# Patient Record
Sex: MALE | Race: WHITE | Employment: FULL TIME | URBAN - METROPOLITAN AREA
[De-identification: names, ages, dates, MRNs, and addresses within clinical notes are randomized per-mention and may not be internally consistent; named-entity substitution may affect disease eponyms.]

---

## 2017-01-27 ENCOUNTER — OFFICE VISIT (OUTPATIENT)
Dept: CARDIOLOGY CLINIC | Age: 57
End: 2017-01-27

## 2017-01-27 VITALS
HEART RATE: 68 BPM | HEIGHT: 69 IN | SYSTOLIC BLOOD PRESSURE: 110 MMHG | WEIGHT: 242.8 LBS | BODY MASS INDEX: 35.96 KG/M2 | OXYGEN SATURATION: 97 % | DIASTOLIC BLOOD PRESSURE: 80 MMHG

## 2017-01-27 DIAGNOSIS — I42.9 CARDIOMYOPATHY (HCC): ICD-10-CM

## 2017-01-27 DIAGNOSIS — E78.00 PURE HYPERCHOLESTEROLEMIA: ICD-10-CM

## 2017-01-27 DIAGNOSIS — I25.10 CORONARY ARTERY DISEASE INVOLVING NATIVE CORONARY ARTERY OF NATIVE HEART WITHOUT ANGINA PECTORIS: Primary | ICD-10-CM

## 2017-01-27 DIAGNOSIS — I50.22 CHRONIC SYSTOLIC CONGESTIVE HEART FAILURE (HCC): ICD-10-CM

## 2017-01-27 PROCEDURE — 99214 OFFICE O/P EST MOD 30 MIN: CPT | Performed by: INTERNAL MEDICINE

## 2017-01-27 RX ORDER — GABAPENTIN 100 MG/1
100 CAPSULE ORAL 3 TIMES DAILY
COMMUNITY
End: 2017-08-11 | Stop reason: ALTCHOICE

## 2017-01-27 ASSESSMENT — ENCOUNTER SYMPTOMS
BLOOD IN STOOL: 0
ABDOMINAL DISTENTION: 0
COUGH: 0
WHEEZING: 0
EYE REDNESS: 0
SHORTNESS OF BREATH: 1

## 2017-05-31 RX ORDER — CARVEDILOL 3.12 MG/1
3.12 TABLET ORAL 2 TIMES DAILY WITH MEALS
Qty: 180 TABLET | Refills: 2 | Status: SHIPPED | OUTPATIENT
Start: 2017-05-31 | End: 2018-02-19 | Stop reason: SDUPTHER

## 2017-07-03 ENCOUNTER — TELEPHONE (OUTPATIENT)
Dept: CARDIOLOGY CLINIC | Age: 57
End: 2017-07-03

## 2017-08-11 ENCOUNTER — OFFICE VISIT (OUTPATIENT)
Dept: CARDIOLOGY CLINIC | Age: 57
End: 2017-08-11

## 2017-08-11 VITALS
OXYGEN SATURATION: 95 % | DIASTOLIC BLOOD PRESSURE: 70 MMHG | HEART RATE: 75 BPM | BODY MASS INDEX: 36.13 KG/M2 | HEIGHT: 68 IN | SYSTOLIC BLOOD PRESSURE: 122 MMHG | WEIGHT: 238.4 LBS

## 2017-08-11 DIAGNOSIS — F17.200 TOBACCO USE DISORDER: ICD-10-CM

## 2017-08-11 DIAGNOSIS — I50.22 CHRONIC SYSTOLIC CONGESTIVE HEART FAILURE (HCC): ICD-10-CM

## 2017-08-11 DIAGNOSIS — E78.2 MIXED HYPERLIPIDEMIA: ICD-10-CM

## 2017-08-11 DIAGNOSIS — R06.09 DYSPNEA ON EXERTION: ICD-10-CM

## 2017-08-11 DIAGNOSIS — I25.5 ISCHEMIC CARDIOMYOPATHY: ICD-10-CM

## 2017-08-11 DIAGNOSIS — I25.10 CORONARY ARTERY DISEASE INVOLVING NATIVE CORONARY ARTERY OF NATIVE HEART WITHOUT ANGINA PECTORIS: Primary | ICD-10-CM

## 2017-08-11 DIAGNOSIS — E78.00 PURE HYPERCHOLESTEROLEMIA: ICD-10-CM

## 2017-08-11 PROCEDURE — 99214 OFFICE O/P EST MOD 30 MIN: CPT | Performed by: INTERNAL MEDICINE

## 2017-08-11 RX ORDER — LISINOPRIL 10 MG/1
10 TABLET ORAL DAILY
COMMUNITY
End: 2021-02-25 | Stop reason: SDUPTHER

## 2017-08-11 RX ORDER — OXYCODONE HCL 10 MG/1
10 TABLET, FILM COATED, EXTENDED RELEASE ORAL EVERY 12 HOURS
COMMUNITY
End: 2020-09-17

## 2017-08-11 RX ORDER — PRAMIPEXOLE DIHYDROCHLORIDE 0.5 MG/1
0.5 TABLET ORAL NIGHTLY
COMMUNITY
End: 2018-02-09

## 2017-08-11 RX ORDER — CLONAZEPAM 0.5 MG/1
0.5 TABLET ORAL 2 TIMES DAILY PRN
COMMUNITY
End: 2019-04-17

## 2017-08-14 ENCOUNTER — TELEPHONE (OUTPATIENT)
Dept: CARDIOLOGY CLINIC | Age: 57
End: 2017-08-14

## 2017-08-14 NOTE — TELEPHONE ENCOUNTER
Patient's insurance requires that the pt have a prior auth before completing his echo. I am currently working on the pt's prior auth. Spoke to Jose Martin [place] today ref # [id number], per Sunny Jolanta should have an answer with in 48 hours.

## 2017-08-21 NOTE — TELEPHONE ENCOUNTER
Called to check on prior auth, it was denied. Spoke to Kaitlin Hernández who states the echo was denied bc there was not a \"new diagnosis\" or change in the pt's status. Please advise if Dr. Jenny Shannon would like to do a peer to peer.

## 2017-08-21 NOTE — TELEPHONE ENCOUNTER
Sending over for Dr. Xie Lot to do Peer to Peer per Lencho Hancock from [place] Ref# [id number] P- [phone].  Info on Dr. Anselmo kraft

## 2017-08-25 NOTE — TELEPHONE ENCOUNTER
Called the insurance company- sat on line for 15 minutes- they hung up on me. Cancel ECHO. Let patient know.

## 2017-08-28 RX ORDER — ATORVASTATIN CALCIUM 40 MG/1
40 TABLET, FILM COATED ORAL NIGHTLY
Qty: 90 TABLET | Refills: 3 | Status: SHIPPED | OUTPATIENT
Start: 2017-08-28 | End: 2018-08-13 | Stop reason: SDUPTHER

## 2017-08-29 NOTE — TELEPHONE ENCOUNTER
Patients wife called [facility] about Echo being precerted and [facility] called our office and gave a Clinical Line number for doctor to call back and get approved. [phone]. If still cancelling  ECHO can a nurse call and explain to patients wife why it is not being ordered now. She will want to know. She always has a lot of medical questions that pertain to her .

## 2017-09-01 NOTE — TELEPHONE ENCOUNTER
Called [facility] too late to do Peer to Peer will have to do a Appeal letter. Dr. Evonne Craig is going to write letter to appeal the Echo. Will type up and send to [facility].

## 2017-09-01 NOTE — TELEPHONE ENCOUNTER
Pt complains of KRISHNAMURTHY and chest pain. Pt has hx of CAD, s/p CABG, mitral insufficiency and CM. Needs echo to evaluate worsening KRISHNAMURTHY.

## 2017-09-12 NOTE — TELEPHONE ENCOUNTER
Faxed an appeal letter to [facility] on Sept 7th. Appeal letter scanned in [address]. Patients wife called again on status of Echo. Gave her info that an appeal was sent in to [facility] and we would get back with her when we hear something.

## 2018-02-09 ENCOUNTER — OFFICE VISIT (OUTPATIENT)
Dept: CARDIOLOGY CLINIC | Age: 58
End: 2018-02-09

## 2018-02-09 VITALS
HEIGHT: 69 IN | HEART RATE: 87 BPM | SYSTOLIC BLOOD PRESSURE: 130 MMHG | WEIGHT: 249.12 LBS | BODY MASS INDEX: 36.9 KG/M2 | DIASTOLIC BLOOD PRESSURE: 70 MMHG | OXYGEN SATURATION: 96 %

## 2018-02-09 DIAGNOSIS — I50.22 CHRONIC SYSTOLIC CONGESTIVE HEART FAILURE (HCC): ICD-10-CM

## 2018-02-09 DIAGNOSIS — I25.5 ISCHEMIC CARDIOMYOPATHY: ICD-10-CM

## 2018-02-09 DIAGNOSIS — E78.00 PURE HYPERCHOLESTEROLEMIA: ICD-10-CM

## 2018-02-09 DIAGNOSIS — F17.200 TOBACCO USE DISORDER: ICD-10-CM

## 2018-02-09 DIAGNOSIS — I25.10 CORONARY ARTERY DISEASE INVOLVING NATIVE CORONARY ARTERY OF NATIVE HEART WITHOUT ANGINA PECTORIS: Primary | ICD-10-CM

## 2018-02-09 PROCEDURE — 3017F COLORECTAL CA SCREEN DOC REV: CPT | Performed by: INTERNAL MEDICINE

## 2018-02-09 PROCEDURE — G8598 ASA/ANTIPLAT THER USED: HCPCS | Performed by: INTERNAL MEDICINE

## 2018-02-09 PROCEDURE — 99214 OFFICE O/P EST MOD 30 MIN: CPT | Performed by: INTERNAL MEDICINE

## 2018-02-09 PROCEDURE — 1036F TOBACCO NON-USER: CPT | Performed by: INTERNAL MEDICINE

## 2018-02-09 PROCEDURE — G8427 DOCREV CUR MEDS BY ELIG CLIN: HCPCS | Performed by: INTERNAL MEDICINE

## 2018-02-09 PROCEDURE — G8417 CALC BMI ABV UP PARAM F/U: HCPCS | Performed by: INTERNAL MEDICINE

## 2018-02-09 PROCEDURE — G8484 FLU IMMUNIZE NO ADMIN: HCPCS | Performed by: INTERNAL MEDICINE

## 2018-02-09 RX ORDER — PREDNISONE 10 MG/1
10 TABLET ORAL DAILY
COMMUNITY
End: 2019-04-17 | Stop reason: ALTCHOICE

## 2018-02-09 ASSESSMENT — ENCOUNTER SYMPTOMS
BLOOD IN STOOL: 0
WHEEZING: 0
COUGH: 0
ABDOMINAL DISTENTION: 0
SHORTNESS OF BREATH: 0
EYE REDNESS: 0

## 2018-02-09 NOTE — PROGRESS NOTES
reviewed. Blood pressure 130/70, pulse 87, height 5' 8.5\" (1.74 m), weight 249 lb 1.9 oz (113 kg), SpO2 96 %. Vitals:    02/09/18 1441   BP: 130/70   Site: Left Arm   Position: Sitting   Cuff Size: Large Adult   Pulse: 87   SpO2: 96%   Weight: 249 lb 1.9 oz (113 kg)   Height: 5' 8.5\" (1.74 m)     Body mass index is 37.33 kg/m². Wt Readings from Last 3 Encounters:   02/09/18 249 lb 1.9 oz (113 kg)   08/11/17 238 lb 6.4 oz (108.1 kg)   01/27/17 242 lb 12.8 oz (110.1 kg)     BP Readings from Last 3 Encounters:   02/09/18 130/70   08/11/17 122/70   01/27/17 110/80        Current Outpatient Prescriptions   Medication Sig Dispense Refill    predniSONE (DELTASONE) 10 MG tablet Take 10 mg by mouth daily      atorvastatin (LIPITOR) 40 MG tablet Take 1 tablet by mouth nightly 90 tablet 3    lisinopril (PRINIVIL;ZESTRIL) 10 MG tablet Take 10 mg by mouth daily      oxyCODONE (OXYCONTIN) 10 MG extended release tablet Take 10 mg by mouth every 12 hours .  clonazePAM (KLONOPIN) 0.5 MG tablet Take 0.5 mg by mouth 2 times daily as needed      carvedilol (COREG) 3.125 MG tablet Take 1 tablet by mouth 2 times daily (with meals) 180 tablet 2    budesonide-formoterol (SYMBICORT) 160-4.5 MCG/ACT AERO Inhale 2 puffs into the lungs 2 times daily      albuterol sulfate  (90 BASE) MCG/ACT inhaler Inhale 2 puffs into the lungs every 4 hours as needed for Wheezing      amoxicillin (AMOXIL) 500 MG capsule Take 500 mg by mouth 3 times daily as needed      predniSONE (DELTASONE) 10 MG tablet Take 10 mg by mouth daily      aspirin 81 MG tablet Take 81 mg by mouth daily      fluticasone (FLONASE) 50 MCG/ACT nasal spray 2 sprays by Nasal route daily      Tiotropium Bromide Monohydrate (SPIRIVA HANDIHALER IN) Inhale into the lungs      pantoprazole (PROTONIX) 40 MG tablet Take 40 mg by mouth daily.  docusate sodium (COLACE, DULCOLAX) 100 MG CAPS Take 100 mg by mouth 2 times daily.  60 capsule 0     No current

## 2018-02-19 RX ORDER — CARVEDILOL 3.12 MG/1
TABLET ORAL
Qty: 180 TABLET | Refills: 2 | Status: SHIPPED | OUTPATIENT
Start: 2018-02-19 | End: 2018-11-05 | Stop reason: SDUPTHER

## 2018-08-13 RX ORDER — ATORVASTATIN CALCIUM 40 MG/1
40 TABLET, FILM COATED ORAL NIGHTLY
Qty: 90 TABLET | Refills: 3 | Status: SHIPPED | OUTPATIENT
Start: 2018-08-13 | End: 2019-08-09 | Stop reason: SDUPTHER

## 2018-09-28 ENCOUNTER — OFFICE VISIT (OUTPATIENT)
Dept: CARDIOLOGY CLINIC | Age: 58
End: 2018-09-28
Payer: COMMERCIAL

## 2018-09-28 VITALS
HEART RATE: 70 BPM | OXYGEN SATURATION: 95 % | WEIGHT: 249 LBS | BODY MASS INDEX: 37.74 KG/M2 | HEIGHT: 68 IN | DIASTOLIC BLOOD PRESSURE: 74 MMHG | SYSTOLIC BLOOD PRESSURE: 114 MMHG

## 2018-09-28 DIAGNOSIS — G47.33 OSA (OBSTRUCTIVE SLEEP APNEA): ICD-10-CM

## 2018-09-28 DIAGNOSIS — E78.2 MIXED HYPERLIPIDEMIA: ICD-10-CM

## 2018-09-28 DIAGNOSIS — I25.5 ISCHEMIC CARDIOMYOPATHY: ICD-10-CM

## 2018-09-28 DIAGNOSIS — R07.89 ATYPICAL CHEST PAIN: ICD-10-CM

## 2018-09-28 DIAGNOSIS — I25.10 CORONARY ARTERY DISEASE INVOLVING NATIVE CORONARY ARTERY OF NATIVE HEART WITHOUT ANGINA PECTORIS: Primary | ICD-10-CM

## 2018-09-28 PROCEDURE — G8427 DOCREV CUR MEDS BY ELIG CLIN: HCPCS | Performed by: INTERNAL MEDICINE

## 2018-09-28 PROCEDURE — 3017F COLORECTAL CA SCREEN DOC REV: CPT | Performed by: INTERNAL MEDICINE

## 2018-09-28 PROCEDURE — 1036F TOBACCO NON-USER: CPT | Performed by: INTERNAL MEDICINE

## 2018-09-28 PROCEDURE — G8598 ASA/ANTIPLAT THER USED: HCPCS | Performed by: INTERNAL MEDICINE

## 2018-09-28 PROCEDURE — G8417 CALC BMI ABV UP PARAM F/U: HCPCS | Performed by: INTERNAL MEDICINE

## 2018-09-28 PROCEDURE — 99214 OFFICE O/P EST MOD 30 MIN: CPT | Performed by: INTERNAL MEDICINE

## 2018-09-28 ASSESSMENT — ENCOUNTER SYMPTOMS
SHORTNESS OF BREATH: 0
WHEEZING: 0
COUGH: 0
EYE REDNESS: 0
ABDOMINAL DISTENTION: 0
BLOOD IN STOOL: 0

## 2018-09-28 NOTE — PROGRESS NOTES
Bilateral expiratory     Abdominal: Soft. Bowel sounds are normal.   Musculoskeletal: Normal range of motion. He exhibits no edema. Neurological: He is alert and oriented to person, place, and time. Skin: Skin is warm and dry. Psychiatric: He has a normal mood and affect. His behavior is normal.   Nursing note and vitals reviewed. Blood pressure 114/74, pulse 70, height 5' 8\" (1.727 m), weight 249 lb (112.9 kg), SpO2 95 %. Vitals:    09/28/18 1405   BP: 114/74   Pulse: 70   SpO2: 95%   Weight: 249 lb (112.9 kg)   Height: 5' 8\" (1.727 m)     Body mass index is 37.86 kg/m². Wt Readings from Last 3 Encounters:   09/28/18 249 lb (112.9 kg)   02/09/18 249 lb 1.9 oz (113 kg)   08/11/17 238 lb 6.4 oz (108.1 kg)     BP Readings from Last 3 Encounters:   09/28/18 114/74   02/09/18 130/70   08/11/17 122/70        Current Outpatient Prescriptions   Medication Sig Dispense Refill    atorvastatin (LIPITOR) 40 MG tablet Take 1 tablet by mouth nightly 90 tablet 3    carvedilol (COREG) 3.125 MG tablet TAKE 1 TABLET BY MOUTH TWICE A  tablet 2    predniSONE (DELTASONE) 10 MG tablet Take 10 mg by mouth daily      lisinopril (PRINIVIL;ZESTRIL) 10 MG tablet Take 10 mg by mouth daily      oxyCODONE (OXYCONTIN) 10 MG extended release tablet Take 10 mg by mouth every 12 hours .       clonazePAM (KLONOPIN) 0.5 MG tablet Take 0.5 mg by mouth 2 times daily as needed      budesonide-formoterol (SYMBICORT) 160-4.5 MCG/ACT AERO Inhale 2 puffs into the lungs 2 times daily      albuterol sulfate  (90 BASE) MCG/ACT inhaler Inhale 2 puffs into the lungs every 4 hours as needed for Wheezing      amoxicillin (AMOXIL) 500 MG capsule Take 500 mg by mouth 3 times daily as needed      predniSONE (DELTASONE) 10 MG tablet Take 10 mg by mouth daily      aspirin 81 MG tablet Take 81 mg by mouth daily      fluticasone (FLONASE) 50 MCG/ACT nasal spray 2 sprays by Nasal route daily      Tiotropium Bromide Monohydrate

## 2018-10-12 ENCOUNTER — TELEPHONE (OUTPATIENT)
Dept: CARDIOLOGY CLINIC | Age: 58
End: 2018-10-12

## 2018-11-05 RX ORDER — CARVEDILOL 3.12 MG/1
TABLET ORAL
Qty: 180 TABLET | Refills: 3 | Status: SHIPPED | OUTPATIENT
Start: 2018-11-05 | End: 2020-01-27

## 2019-04-17 ENCOUNTER — OFFICE VISIT (OUTPATIENT)
Dept: CARDIOLOGY CLINIC | Age: 59
End: 2019-04-17
Payer: COMMERCIAL

## 2019-04-17 VITALS
DIASTOLIC BLOOD PRESSURE: 80 MMHG | OXYGEN SATURATION: 96 % | BODY MASS INDEX: 37.16 KG/M2 | HEIGHT: 68 IN | HEART RATE: 84 BPM | SYSTOLIC BLOOD PRESSURE: 120 MMHG | WEIGHT: 245.2 LBS

## 2019-04-17 DIAGNOSIS — I42.8 OTHER CARDIOMYOPATHY (HCC): ICD-10-CM

## 2019-04-17 DIAGNOSIS — E78.2 MIXED HYPERLIPIDEMIA: ICD-10-CM

## 2019-04-17 DIAGNOSIS — I25.10 CORONARY ARTERY DISEASE INVOLVING NATIVE CORONARY ARTERY OF NATIVE HEART WITHOUT ANGINA PECTORIS: Primary | ICD-10-CM

## 2019-04-17 PROCEDURE — 1036F TOBACCO NON-USER: CPT | Performed by: INTERNAL MEDICINE

## 2019-04-17 PROCEDURE — G8417 CALC BMI ABV UP PARAM F/U: HCPCS | Performed by: INTERNAL MEDICINE

## 2019-04-17 PROCEDURE — G8427 DOCREV CUR MEDS BY ELIG CLIN: HCPCS | Performed by: INTERNAL MEDICINE

## 2019-04-17 PROCEDURE — 3017F COLORECTAL CA SCREEN DOC REV: CPT | Performed by: INTERNAL MEDICINE

## 2019-04-17 PROCEDURE — 99214 OFFICE O/P EST MOD 30 MIN: CPT | Performed by: INTERNAL MEDICINE

## 2019-04-17 PROCEDURE — G8598 ASA/ANTIPLAT THER USED: HCPCS | Performed by: INTERNAL MEDICINE

## 2019-04-17 RX ORDER — MORPHINE SULFATE 30 MG/1
30 TABLET ORAL EVERY 4 HOURS PRN
COMMUNITY
End: 2019-10-24

## 2019-04-17 RX ORDER — PRAMIPEXOLE DIHYDROCHLORIDE 0.5 MG/1
0.5 TABLET ORAL NIGHTLY
COMMUNITY
End: 2020-09-17

## 2019-04-17 ASSESSMENT — ENCOUNTER SYMPTOMS
WHEEZING: 1
EYE REDNESS: 0
COUGH: 0
BLOOD IN STOOL: 0
ABDOMINAL DISTENTION: 0
SHORTNESS OF BREATH: 0

## 2019-04-17 NOTE — PATIENT INSTRUCTIONS
Patient Education        Learning About Coronary Artery Disease (CAD)  What is coronary artery disease? Coronary artery disease (CAD) occurs when plaque builds up in the arteries that bring oxygen-rich blood to your heart. Plaque is a fatty substance made of cholesterol, calcium, and other substances in the blood. This process is called hardening of the arteries, or atherosclerosis. What happens when you have coronary artery disease? · Plaque may narrow the coronary arteries. Narrowed arteries cause poor blood flow. This can lead to angina symptoms such as chest pain or discomfort. If blood flow is completely blocked, you could have a heart attack. · You can slow CAD and reduce the risk of future problems by making changes in your lifestyle. These include quitting smoking and eating heart-healthy foods. · Treatments for CAD, along with changes in your lifestyle, can help you live a longer and healthier life. How can you prevent coronary artery disease? · Do not smoke. It may be the best thing you can do to prevent heart disease. If you need help quitting, talk to your doctor about stop-smoking programs and medicines. These can increase your chances of quitting for good. · Be active. Get at least 30 minutes of exercise on most days of the week. Walking is a good choice. You also may want to do other activities, such as running, swimming, cycling, or playing tennis or team sports. · Eat heart-healthy foods. Eat more fruits and vegetables and less foods that contain saturated and trans fats. Limit alcohol, sodium, and sweets. · Stay at a healthy weight. Lose weight if you need to. · Manage other health problems such as diabetes, high blood pressure, and high cholesterol. · Manage stress. Stress can hurt your heart. To keep stress low, talk about your problems and feelings. Don't keep your feelings hidden. · If you have talked about it with your doctor, take a low-dose aspirin every day.  Aspirin can help certain people lower their risk of a heart attack or stroke. But taking aspirin isn't right for everyone, because it can cause serious bleeding. Do not start taking daily aspirin unless your doctor knows about it. How is coronary artery disease treated? · Your doctor will suggest that you make lifestyle changes. For example, your doctor may ask you to eat healthy foods, quit smoking, lose extra weight, and be more active. · You will have to take medicines. · Your doctor may suggest a procedure to open narrowed or blocked arteries. This is called angioplasty. Or your doctor may suggest using healthy blood vessels to create detours around narrowed or blocked arteries. This is called bypass surgery. Follow-up care is a key part of your treatment and safety. Be sure to make and go to all appointments, and call your doctor if you are having problems. It's also a good idea to know your test results and keep a list of the medicines you take. Where can you learn more? Go to https://[employer].ServerEngines. org and sign in to your [employer] account. Enter [phone] in the [employer] box to learn more about \"Learning About Coronary Artery Disease (CAD). \"     If you do not have an account, please click on the \"Sign Up Now\" link. Current as of: July 22, 2018  Content Version: 11.9  © 2488-0475 [employer]. Care instructions adapted under license by [facility] ([facility]). If you have questions about a medical condition or this instruction, always ask your healthcare professional. [place] [address] any warranty or liability for your use of this information.

## 2019-04-17 NOTE — PROGRESS NOTES
Subjective:      Patient ID: Calos Malloy is a 63 y.o. male. Reason for visit: f/u CAD    HPI Calos Malloy presents today for a follow up for CAD. He denies exertional chest pain, palpitations, dizziness, syncope, leg swelling and worsening chronic dyspnea. He states that his chest pain has improved but continues to have randomly occurring, sharp, brief episodes of chest pain. Review of Systems   Constitutional: Negative for activity change, appetite change, chills, fatigue, fever and unexpected weight change. HENT: Negative for congestion, nosebleeds and tinnitus. Eyes: Negative for redness and visual disturbance. Respiratory: Positive for wheezing. Negative for cough and shortness of breath. Cardiovascular: Positive for chest pain. Negative for palpitations and leg swelling. Gastrointestinal: Negative for abdominal distention and blood in stool. Genitourinary: Negative for dysuria and hematuria. Musculoskeletal: Negative for gait problem and myalgias. Neurological: Negative for dizziness and speech difficulty. Hematological: Does not bruise/bleed easily. Psychiatric/Behavioral: Negative for behavioral problems and confusion. All other systems reviewed and are negative. Objective:   Physical Exam   Constitutional: He is oriented to person, place, and time. He appears well-developed and well-nourished. obese   HENT:   Head: Normocephalic and atraumatic. Eyes: Conjunctivae are normal. Right eye exhibits no discharge. Left eye exhibits no discharge. Neck: Normal range of motion. Neck supple. Cardiovascular: Normal rate, regular rhythm, normal heart sounds and intact distal pulses. Pulmonary/Chest: Effort normal. He has no wheezes. Abdominal: Soft. Bowel sounds are normal.   Musculoskeletal: Normal range of motion. He exhibits no edema. Neurological: He is alert and oriented to person, place, and time. Skin: Skin is warm and dry.    Psychiatric: He has a capsule 0     No current facility-administered medications for this visit. Past Surgical History:   Procedure Laterality Date    CORONARY ARTERY BYPASS GRAFT      CORONARY ARTERY BYPASS GRAFT      ELBOW SURGERY      EYE SURGERY  1965    SHOULDER SURGERY      SKIN CANCER EXCISION       Social History     Tobacco Use    Smoking status: Former Smoker     Packs/day: 1.50     Years: 40.00     Pack years: 60.00     Types: Cigarettes     Last attempt to quit: 2014     Years since quittin.4    Smokeless tobacco: Never Used    Tobacco comment: Quit with surgery   Substance Use Topics    Alcohol use: No    Drug use: No     No Known Allergies  Family History   Problem Relation Age of Onset    High Blood Pressure Mother     Heart Disease Mother     Diabetes Mother      Recent labs and imaging reviewed. Assessment:       CAD s/p NSTEMI -->Cath 2014 99% LM. s/p CABG x3 LIMA-LAD, SVG-->RI-->OM. 2015. Plain GXT 2015 no ischemia.  SANTINO- using BiPAP     Tobacco use history/COPD      Cessation discussed, Quit 2014    HLD, on statin. LDL 76 2018.  Ulcer ([address]       Hx        Chest pain, chronic, atypical since CABG surgery. Cardiomyopathy- ischemia, LVEF 35% by cath 2014. Echo 2014 LVEF 40-45%, mild-mod MR. Echo 10/2017 LVEF normal, grade I DD, mildly reduced RVSF. CHF- chronic systolic with restored LVEF. Palpitations. R/o arrhythmia. Event recorder 2016 no arrhythmias. Hyperkalemia- ACE dose reduced by PCP. Low potassium diet recommended. Repeat K normal.        COPD followed by Dr. Sophia Mtz. Steroid dependent, using BiPAP and nebulizer. Pt states that he needs lung transplant. Plan:      No angina or clinical evidence of CHF. Recent stress test showed no ischemia. Continue ASA, statin, BB and ACE. Fasting lipid profile, CMP prior to next visit at PCP's. Follow up in 6 months.      This note was scribed in the presence of the physician by Tomasa Adams RN. The scribes documentation has been prepared under my direction and personally reviewed by me in its entirety. I confirm that the note above accurately reflects all work, treatment, procedures, and medical decision making performed by me.

## 2019-04-17 NOTE — LETTER
[address] Cardiology - [address] [address]  [address]   [address] [address]  Phone: [phone]  Fax: Tierra Velázquez MD        April 24, 2019     Richy Avila, [address] [address] [facility] [address]    Patient: Ashley Sanchez  MR Number: [id number]  YOB: 1960  Date of Visit: 4/17/2019    Dear Dr. Richy Avila: Thank you for your referral. Progress note attached in visit summary. If you have questions, please do not hesitate to call me. I look forward to following Soila Mccall along with you.     Sincerely,        Duyen Tavares MD

## 2019-08-09 RX ORDER — ATORVASTATIN CALCIUM 40 MG/1
TABLET, FILM COATED ORAL
Qty: 90 TABLET | Refills: 3 | Status: SHIPPED | OUTPATIENT
Start: 2019-08-09 | End: 2021-02-25 | Stop reason: SDUPTHER

## 2019-10-24 ENCOUNTER — OFFICE VISIT (OUTPATIENT)
Dept: CARDIOLOGY CLINIC | Age: 59
End: 2019-10-24
Payer: COMMERCIAL

## 2019-10-24 VITALS
SYSTOLIC BLOOD PRESSURE: 120 MMHG | HEART RATE: 75 BPM | BODY MASS INDEX: 36.83 KG/M2 | OXYGEN SATURATION: 95 % | WEIGHT: 243 LBS | HEIGHT: 68 IN | DIASTOLIC BLOOD PRESSURE: 60 MMHG

## 2019-10-24 DIAGNOSIS — I25.10 CORONARY ARTERY DISEASE INVOLVING NATIVE CORONARY ARTERY OF NATIVE HEART WITHOUT ANGINA PECTORIS: Primary | ICD-10-CM

## 2019-10-24 DIAGNOSIS — I25.5 ISCHEMIC CARDIOMYOPATHY: ICD-10-CM

## 2019-10-24 DIAGNOSIS — G47.33 OSA (OBSTRUCTIVE SLEEP APNEA): ICD-10-CM

## 2019-10-24 DIAGNOSIS — E78.2 MIXED HYPERLIPIDEMIA: ICD-10-CM

## 2019-10-24 PROCEDURE — G8484 FLU IMMUNIZE NO ADMIN: HCPCS | Performed by: INTERNAL MEDICINE

## 2019-10-24 PROCEDURE — G8427 DOCREV CUR MEDS BY ELIG CLIN: HCPCS | Performed by: INTERNAL MEDICINE

## 2019-10-24 PROCEDURE — G8598 ASA/ANTIPLAT THER USED: HCPCS | Performed by: INTERNAL MEDICINE

## 2019-10-24 PROCEDURE — 1036F TOBACCO NON-USER: CPT | Performed by: INTERNAL MEDICINE

## 2019-10-24 PROCEDURE — G8417 CALC BMI ABV UP PARAM F/U: HCPCS | Performed by: INTERNAL MEDICINE

## 2019-10-24 PROCEDURE — 3017F COLORECTAL CA SCREEN DOC REV: CPT | Performed by: INTERNAL MEDICINE

## 2019-10-24 PROCEDURE — 99214 OFFICE O/P EST MOD 30 MIN: CPT | Performed by: INTERNAL MEDICINE

## 2019-10-24 ASSESSMENT — ENCOUNTER SYMPTOMS
ABDOMINAL DISTENTION: 0
BLOOD IN STOOL: 0
WHEEZING: 1
EYE REDNESS: 0
SHORTNESS OF BREATH: 0
COUGH: 0

## 2020-01-27 RX ORDER — CARVEDILOL 3.12 MG/1
TABLET ORAL
Qty: 180 TABLET | Refills: 3 | Status: SHIPPED | OUTPATIENT
Start: 2020-01-27 | End: 2021-02-25 | Stop reason: SDUPTHER

## 2020-04-07 ENCOUNTER — TELEPHONE (OUTPATIENT)
Dept: CARDIOLOGY CLINIC | Age: 60
End: 2020-04-07

## 2020-04-07 NOTE — TELEPHONE ENCOUNTER
Per Dr. Jordana Frank. Meloxicam can increase risk of heart failure and bleeding. Better to avoid this medication.  Check with PCP for alternative

## 2020-04-07 NOTE — TELEPHONE ENCOUNTER
Patient was put on Meloxicam 15 mg by the [address] [place] [address]. His wife wants to know if he is okay to take that med with his cardiac history. Please call patient back at [phone].

## 2020-09-02 ASSESSMENT — ENCOUNTER SYMPTOMS
EYE REDNESS: 0
ABDOMINAL DISTENTION: 0
SHORTNESS OF BREATH: 0
BLOOD IN STOOL: 0
WHEEZING: 0
COUGH: 0

## 2020-09-02 NOTE — PROGRESS NOTES
Subjective:      Patient ID: Kathy Garcia is a 62 y.o. male. Reason for visit:  f/u CAD                   HPI Kathy Garcia presents today for a follow up for CAD. Today he denies exertional chest pain, palpitations, dizziness, syncope, worsening leg swelling and worsening chronic dyspnea. He has occasional, randomly occurring sharp brief pain along his sternal incision. He states that he is feeling well. Review of Systems   Constitutional: Negative for activity change, appetite change, chills, fatigue, fever and unexpected weight change. HENT: Negative for congestion, nosebleeds and tinnitus. Eyes: Negative for redness and visual disturbance. Respiratory: Negative for cough, shortness of breath and wheezing. Cardiovascular: Negative for palpitations and leg swelling. Gastrointestinal: Negative for abdominal distention and blood in stool. Genitourinary: Negative for dysuria and hematuria. Musculoskeletal: Negative for gait problem and myalgias. Neurological: Negative for dizziness and speech difficulty. Hematological: Does not bruise/bleed easily. Psychiatric/Behavioral: Negative for behavioral problems and confusion. All other systems reviewed and are negative. Objective:   Physical Exam   Constitutional: He is oriented to person, place, and time. He appears well-developed and well-nourished. obese   HENT:   Head: Normocephalic and atraumatic. Eyes: Conjunctivae and EOM are normal.   Neck: Normal range of motion. Neck supple. Cardiovascular: Normal rate, regular rhythm, S1 normal and S2 normal. Exam reveals no gallop. No murmur heard. Pulmonary/Chest: Effort normal. He has wheezes. Diminished, bilateral wheezes   Abdominal: Soft. Bowel sounds are normal.   Musculoskeletal: Normal range of motion. Neurological: He is alert and oriented to person, place, and time. Skin: Skin is warm and dry. Psychiatric: He has a normal mood and affect.  His behavior is normal. Pack years: 60.00     Types: Cigarettes     Last attempt to quit: 2014     Years since quittin.8    Smokeless tobacco: Never Used    Tobacco comment: Quit with surgery   Substance Use Topics    Alcohol use: No    Drug use: No     No Known Allergies  Family History   Problem Relation Age of Onset    High Blood Pressure Mother     Heart Disease Mother     Diabetes Mother      Recent labs and imaging reviewed. Assessment:       CAD s/p NSTEMI -->Cath 2014 99% LM. s/p CABG x3 LIMA-LAD, SVG-->RI-->OM. 2015. Plain GXT 2015 no ischemia. Stress test 10/10/18 normal stress test with no ischemia        SANTINO- using BiPAP       COPD/Prior tobacco use      Cessation discussed, Quit 2014. Followed by pulmonary. Steroid dependent, using BiPAP and nebulizer. Pt states that he needs lung transplant.  HLD, on statin. LDL 77 LFT's mildly elevated 2020.  Ulcer ([address]       Hx          Chest pain, chronic, atypical since CABG surgery. Cardiomyopathy- ischemia with restored LVEF.  LVEF 35% by cath 2014. Echo 2014 LVEF 40-45%, mild-mod MR. Echo 10/2017 LVEF normal, grade I DD, mildly reduced RVSF. Palpitations. R/o arrhythmia. Event recorder 2016 no arrhythmias. Hyperkalemia - ACE dose reduced by PCP. Low potassium diet recommended. Repeat K normal.       Plan:   Clinically stable. No evidence of CHF. LVEF improved. No angina. Continue ASA, statin,  BB, and ACE in view of CAD/CM. Risk factor modification also discussed. Follow up in 6 months. This note was scribed in the presence of the physician by Les Peralta RN. The scribes documentation has been prepared under my direction and personally reviewed by me in its entirety. I confirm that the note above accurately reflects all work, treatment, procedures, and medical decision making performed by me.

## 2020-09-17 ENCOUNTER — OFFICE VISIT (OUTPATIENT)
Dept: CARDIOLOGY CLINIC | Age: 60
End: 2020-09-17
Payer: MEDICARE

## 2020-09-17 VITALS
OXYGEN SATURATION: 97 % | DIASTOLIC BLOOD PRESSURE: 60 MMHG | HEIGHT: 68 IN | BODY MASS INDEX: 37.04 KG/M2 | SYSTOLIC BLOOD PRESSURE: 100 MMHG | HEART RATE: 78 BPM | WEIGHT: 244.4 LBS

## 2020-09-17 PROCEDURE — G8427 DOCREV CUR MEDS BY ELIG CLIN: HCPCS | Performed by: INTERNAL MEDICINE

## 2020-09-17 PROCEDURE — G8417 CALC BMI ABV UP PARAM F/U: HCPCS | Performed by: INTERNAL MEDICINE

## 2020-09-17 PROCEDURE — 99214 OFFICE O/P EST MOD 30 MIN: CPT | Performed by: INTERNAL MEDICINE

## 2020-09-17 PROCEDURE — 1036F TOBACCO NON-USER: CPT | Performed by: INTERNAL MEDICINE

## 2020-09-17 PROCEDURE — 3017F COLORECTAL CA SCREEN DOC REV: CPT | Performed by: INTERNAL MEDICINE

## 2020-09-17 NOTE — LETTER
[facility] Cardiology - [facility] [address]  [address]  Phone: [phone]  Fax: Tierra Velázquez MD        October 5, 2020     Massiel Laws, [address]    Patient: Monica Tillman  MR Number: <[id number]>  YOB: 1960  Date of Visit: 9/17/2020    Dear Dr. Massiel Laws: Thank you for your referral. Progress note attached in visit summary. If you have questions, please do not hesitate to call me. I look forward to following Kathy Duran along with you.     Sincerely,        Thuan Saravia MD

## 2020-09-17 NOTE — PATIENT INSTRUCTIONS
Patient Education        Learning About Coronary Artery Disease (CAD)  What is coronary artery disease? Coronary artery disease (CAD) occurs when plaque builds up in the arteries that bring oxygen-rich blood to your heart. Plaque is a fatty substance made of cholesterol, calcium, and other substances in the blood. This process is called hardening of the arteries, or atherosclerosis. What happens when you have coronary artery disease? · Plaque may narrow the coronary arteries. Narrowed arteries cause poor blood flow. This can lead to angina symptoms such as chest pain or discomfort. If blood flow is completely blocked, you could have a heart attack. · You can slow CAD and reduce the risk of future problems by making changes in your lifestyle. These include quitting smoking and eating heart-healthy foods. · Treatments for CAD, along with changes in your lifestyle, can help you live a longer and healthier life. How can you prevent coronary artery disease? · Do not smoke. It may be the best thing you can do to prevent heart disease. If you need help quitting, talk to your doctor about stop-smoking programs and medicines. These can increase your chances of quitting for good. · Be active. Get at least 30 minutes of exercise on most days of the week. Walking is a good choice. You also may want to do other activities, such as running, swimming, cycling, or playing tennis or team sports. · Eat heart-healthy foods. Eat more fruits and vegetables and less foods that contain saturated and trans fats. Limit alcohol, sodium, and sweets. · Stay at a healthy weight. Lose weight if you need to. · Manage other health problems such as diabetes, high blood pressure, and high cholesterol. How is coronary artery disease treated? · Your doctor will suggest that you make lifestyle changes. For example, your doctor may ask you to eat healthy foods, quit smoking, lose extra weight, and be more active.   · You will have to take medicines. · Your doctor may suggest a procedure to open narrowed or blocked arteries. This is called angioplasty. Or your doctor may suggest using healthy blood vessels to create detours around narrowed or blocked arteries. This is called bypass surgery. Follow-up care is a key part of your treatment and safety. Be sure to make and go to all appointments, and call your doctor if you are having problems. It's also a good idea to know your test results and keep a list of the medicines you take. Where can you learn more? Go to https://AnhelopeModanisa.MAR [email or web address] org and sign in to your [employer] account. Enter [phone] in the [employer] box to learn more about \"Learning About Coronary Artery Disease (CAD). \"     If you do not have an account, please click on the \"Sign Up Now\" link. Current as of: December 16, 2019               Content Version: 12.5  © 4613-6158 [employer]wise, Incorporated. Care instructions adapted under license by [facility] ([facility]). If you have questions about a medical condition or this instruction, always ask your healthcare professional. [place] [address] any warranty or liability for your use of this information.

## 2021-02-18 PROBLEM — I25.5 ISCHEMIC CARDIOMYOPATHY: Status: ACTIVE | Noted: 2021-02-18

## 2021-02-18 PROBLEM — E78.00 PURE HYPERCHOLESTEROLEMIA: Status: ACTIVE | Noted: 2021-02-18

## 2021-02-18 NOTE — PROGRESS NOTES
[facility]      Cardiology Consult    Mya Garrett  1960    February 25, 2021    Primary Physician: Dr. Den Lopez  Reason for visit: CAD s/p CABG and CHF    CC: \"same chest pains\"     HPI:  The patient is 62 y.o. male with a past medical history significant for CAD s/p NSTEMI 11/2014 and CABG x3 with LIMA->LAD, SVG-->RI-->OM, ischemic CM with restored LVEF, chronic chest pains, SANTINO, steroid dependent COPD, and HLD who presents for chronic CAD management. He initially presented with NSTEMI 11/2014 with diffuse severe chest pain and associated left arm pain. He denies any interventions since CABG but last had a stress test in 2018 secondary to recurrent chest pains. He endorses left sided chest pain when shoveling snow last week that lasted 10 minutes without radiation. With normal activity, he denies exertional chest pain. He also has intermittent but chronic left sternal discomfort. Both the left sided and sternal pains have occurred intermittently since surgery. He denies any changes in intensity, frequency, or duration of his chest pains. He has chronic KRISHNAMURTHY but also denies acute changes. Patient reports compliance to his medications. Patient denies PND, orthopnea, palpitations, lightheadedness, weight changes, changes in LE edema, and syncope. Past Medical History:   Diagnosis Date    Abnormal EKG     Cardiomyopathy ([address]     Echo 11/2014 LVEF 35-40%    Chest pain     CHF (congestive heart failure) (HCC)     COPD (chronic obstructive pulmonary disease) (HCC)     Coronary artery disease 11/11/14    s/p NSTEMI, Cath 99% LM lesion, s/p CABG    Family history of early CAD     GERD (gastroesophageal reflux disease)     HLD (hyperlipidemia)     Osteoarthritis     Tobacco use disorder     Cessation discussed    Tobacco use disorder     Ulcer     hx of.       Past Surgical History:   Procedure Laterality Date    CARPAL TUNNEL RELEASE      CATARACT REMOVAL      CORONARY ARTERY BYPASS GRAFT      CORONARY ARTERY BYPASS GRAFT      ELBOW SURGERY      EYE SURGERY  1965    SHOULDER SURGERY      SKIN CANCER EXCISION       Family History   Problem Relation Age of Onset    High Blood Pressure Mother     Heart Disease Mother     Diabetes Mother      Social History     Tobacco Use    Smoking status: Former Smoker     Packs/day: 1.50     Years: 40.00     Pack years: 60.00     Types: Cigarettes     Quit date: 2014     Years since quittin.2    Smokeless tobacco: Never Used    Tobacco comment: Quit with surgery   Substance Use Topics    Alcohol use: No    Drug use: No       No Known Allergies  Current Outpatient Medications   Medication Sig Dispense Refill    docusate sodium (COLACE) 100 MG capsule Take 100 mg by mouth 2 times daily      oxyCODONE (OXYCONTIN) 10 MG extended release tablet Take 10 mg by mouth every 12 hours.  carvedilol (COREG) 3.125 MG tablet TAKE 1 TABLET BY MOUTH TWICE A  tablet 3    atorvastatin (LIPITOR) 40 MG tablet TAKE 1 TABLET BY MOUTH EVERY DAY AT NIGHT 90 tablet 3    Multiple Vitamin (MULTI-DAY PO) Take by mouth      lisinopril (PRINIVIL;ZESTRIL) 10 MG tablet Take 10 mg by mouth daily      budesonide-formoterol (SYMBICORT) 160-4.5 MCG/ACT AERO Inhale 2 puffs into the lungs 2 times daily      albuterol sulfate  (90 BASE) MCG/ACT inhaler Inhale 2 puffs into the lungs every 4 hours as needed for Wheezing      predniSONE (DELTASONE) 10 MG tablet Take 10 mg by mouth daily      aspirin 81 MG tablet Take 81 mg by mouth daily      fluticasone (FLONASE) 50 MCG/ACT nasal spray 2 sprays by Nasal route daily      Tiotropium Bromide Monohydrate (SPIRIVA HANDIHALER IN) Inhale into the lungs      pantoprazole (PROTONIX) 40 MG tablet Take 40 mg by mouth daily. No current facility-administered medications for this visit. Review of Systems:  · Constitutional: No unanticipated weight loss.  There's been no change in energy level, sleep pattern, or activity level. No fevers, chills. · Eyes: No visual changes or diplopia. No scleral icterus. · ENT: No Headaches, hearing loss or vertigo. No mouth sores or sore throat. · Cardiovascular: as reviewed in HPI  · Respiratory: No cough or wheezing, no sputum production. No hemoptysis. · Gastrointestinal: No abdominal pain, appetite loss, blood in stools. No change in bowel or bladder habits. · Genitourinary: No dysuria, trouble voiding, or hematuria. · Musculoskeletal:  No gait disturbance, no joint complaints. · Integumentary: No rash or pruritis. · Neurological: No headache, diplopia, change in muscle strength, numbness or tingling. · Psychiatric: No anxiety or depression. · Endocrine: No temperature intolerance. No excessive thirst, fluid intake, or urination. No tremor. · Hematologic/Lymphatic: No abnormal bruising or bleeding, blood clots or swollen lymph nodes. · Allergic/Immunologic: No nasal congestion or hives. Physical Exam:   /84 (Site: Left Upper Arm, Position: Sitting, Cuff Size: Medium Adult)   Pulse 72   Temp 96.9 °F (36.1 °C)   Ht 6' (1.829 m)   Wt 241 lb (109.3 kg)   SpO2 97%   BMI 32.69 kg/m²   Wt Readings from Last 3 Encounters:   02/25/21 241 lb (109.3 kg)   09/17/20 244 lb 6.4 oz (110.9 kg)   10/24/19 243 lb (110.2 kg)     Constitutional: He is oriented to person, place, and time. He appears well-developed and well-nourished. In no acute distress. Head: Normocephalic and atraumatic. Pupils equal and round. Neck: Neck supple. No JVP or carotid bruit appreciated. No mass and no thyromegaly present. No lymphadenopathy present. Cardiovascular: Normal rate. Normal heart sounds. Exam reveals no gallop and no friction rub. No murmur heard. Pulmonary/Chest: Effort normal. No respiratory distress. Diminished breath sounds with diffuse bilateral wheezes. Abdominal: Soft, non-tender. Bowel sounds are normal. He exhibits no organomegaly, mass or bruit. Extremities: No edema. No cyanosis or clubbing. Pulses are 2+ radial/carotid bilaterally. Neurological: No gross cranial nerve deficit. Coordination normal.   Skin: Skin is warm and dry. There is no rash or diaphoresis. Psychiatric: He has a normal mood and affect. His speech is normal and behavior is normal.     Lab Review:   FLP:  5/2020 , HDL 31, LDL 77            12/2020 , HDL 35, LDL 80. Lab Results   Component Value Date    TRIG 64 11/11/2014    HDL 42 11/11/2014    LDLCALC 98 11/11/2014    LABVLDL 13 11/11/2014     BUN/Creatinine:    Lab Results   Component Value Date    BUN 26 11/19/2014    CREATININE 0.9 11/19/2014 5/2020 BUN 20, Cr 1.19. K 4.4. ALT 54, AST 65, Alk phos normal.   12/2020 BUN 16, Cr 1.0. K 4.4. H/H normal.     EKG Interpretation:     Event recorder 7/2016 no arrhythmias. Image Review:     11/2014 Cath findings  1. Severe left main coronary artery disease of 99%. There is 25% plaque disease otherwise in the left anterior descending coronary artery and circumflex. This is a right dominant coronary arterial system with a 60% proximal right coronary artery lesion. There are collaterals to the left system from the right, which is a large vessel. 2.  Moderately severe left ventricular systolic dysfunction with anterior wall hypokinesis in the mid and distal portions in the apex. Left ventricle ejection fraction 35%. 3.  No gradient across the aortic valve on pullback to assess the aortic stenosis. 4.  Moderately severe left ventricular diastolic dysfunction with an left ventricle end-diastolic pressure of 29 mmHg. s/p CABG x3 LIMA->LAD, SVG->RI->OM. Plain GXT 4/2015 no ischemia. Stress test 10/10/18 normal stress test with no ischemia      Echo 11/2014 LVEF 40-45%, mild-mod MR. Echo 10/2017 LVEF normal, grade I DD, mildly reduced RVSF. Assessment/Plan:     1. CAD s/p CABG (2014) with chronic angina.  Patient describes both chronic exertional chest pains and chest wall pains. Continue with medical management and risk factor modification including aspirin, statin, and B-blocker. Will prescribe NTG SL and instructed pt in use. Advised to call if having chest pains of increasing frequency, duration, or intensity. 2. Ischemic CM with restored LVEF. Patient appears compensated. Continue BBlocker and ACE-I. ACE-I dose previously lowered secondary to hyperkalemia. 3. Hyperlipidemia. Continue high intensity statin. 4. COPD/SANTINO. Steroid dependent, using BiPAP and nebulizer. Followed by pulmonary. 5. Essential hypertension. Controlled. Goal BP <130/80. Continue medical therapy. F/u in office in 6 months    Thank you very much for allowing me to participate in the care of your patient. Please do not hesitate to contact me if you have any questions. Sincerely,  Nisha Escobedo. Charley Munguia, [address] [place] [address]  Ph: [phone]  Fax: [phone]    This note was scribed in the presence of the physician by Dania Granados RN. Physician Attestation: The scribes documentation has been prepared under my direction and personally reviewed by me in its entirety. I confirm that the note above accurately reflects all work, treatment, procedures, and medical decision making performed by me. All portions of the note including but not limited to the chief complaint, history of present illness, physical exam, assessment and plan/medical decision making were personally reviewed, edited, and updated on the day of the visit.

## 2021-02-25 ENCOUNTER — OFFICE VISIT (OUTPATIENT)
Dept: CARDIOLOGY CLINIC | Age: 61
End: 2021-02-25
Payer: MEDICARE

## 2021-02-25 VITALS
HEIGHT: 72 IN | SYSTOLIC BLOOD PRESSURE: 126 MMHG | DIASTOLIC BLOOD PRESSURE: 84 MMHG | OXYGEN SATURATION: 97 % | HEART RATE: 72 BPM | WEIGHT: 241 LBS | BODY MASS INDEX: 32.64 KG/M2 | TEMPERATURE: 96.9 F

## 2021-02-25 DIAGNOSIS — E78.00 PURE HYPERCHOLESTEROLEMIA: ICD-10-CM

## 2021-02-25 DIAGNOSIS — I25.118 CORONARY ARTERY DISEASE OF NATIVE ARTERY OF NATIVE HEART WITH STABLE ANGINA PECTORIS (HCC): Primary | ICD-10-CM

## 2021-02-25 DIAGNOSIS — I10 ESSENTIAL HYPERTENSION: ICD-10-CM

## 2021-02-25 DIAGNOSIS — I25.5 ISCHEMIC CARDIOMYOPATHY: ICD-10-CM

## 2021-02-25 DIAGNOSIS — Z95.1 HX OF CABG: ICD-10-CM

## 2021-02-25 PROCEDURE — 99214 OFFICE O/P EST MOD 30 MIN: CPT | Performed by: INTERNAL MEDICINE

## 2021-02-25 PROCEDURE — G8417 CALC BMI ABV UP PARAM F/U: HCPCS | Performed by: INTERNAL MEDICINE

## 2021-02-25 PROCEDURE — G8484 FLU IMMUNIZE NO ADMIN: HCPCS | Performed by: INTERNAL MEDICINE

## 2021-02-25 PROCEDURE — 1036F TOBACCO NON-USER: CPT | Performed by: INTERNAL MEDICINE

## 2021-02-25 PROCEDURE — G8427 DOCREV CUR MEDS BY ELIG CLIN: HCPCS | Performed by: INTERNAL MEDICINE

## 2021-02-25 PROCEDURE — 3017F COLORECTAL CA SCREEN DOC REV: CPT | Performed by: INTERNAL MEDICINE

## 2021-02-25 RX ORDER — DOCUSATE SODIUM 100 MG/1
100 CAPSULE, LIQUID FILLED ORAL 2 TIMES DAILY
COMMUNITY

## 2021-02-25 RX ORDER — LISINOPRIL 10 MG/1
10 TABLET ORAL DAILY
Qty: 90 TABLET | Refills: 3 | Status: SHIPPED | OUTPATIENT
Start: 2021-02-25 | End: 2021-10-28

## 2021-02-25 RX ORDER — OXYCODONE HCL 10 MG/1
10 TABLET, FILM COATED, EXTENDED RELEASE ORAL EVERY 12 HOURS
COMMUNITY

## 2021-02-25 RX ORDER — ATORVASTATIN CALCIUM 40 MG/1
TABLET, FILM COATED ORAL
Qty: 90 TABLET | Refills: 3 | Status: SHIPPED | OUTPATIENT
Start: 2021-02-25 | End: 2021-10-28

## 2021-02-25 RX ORDER — CARVEDILOL 3.12 MG/1
TABLET ORAL
Qty: 180 TABLET | Refills: 3 | Status: SHIPPED | OUTPATIENT
Start: 2021-02-25 | End: 2022-03-24 | Stop reason: SDUPTHER

## 2021-02-25 RX ORDER — NITROGLYCERIN 0.4 MG/1
0.4 TABLET SUBLINGUAL EVERY 5 MIN PRN
Qty: 25 TABLET | Refills: 3 | Status: SHIPPED | OUTPATIENT
Start: 2021-02-25

## 2021-02-25 NOTE — PATIENT INSTRUCTIONS
Patient Education        Heart-Healthy Diet: Care Instructions  Your Care Instructions     A heart-healthy diet has lots of vegetables, fruits, nuts, beans, and whole grains, and is low in salt. It limits foods that are high in saturated fat, such as meats, cheeses, and fried foods. It may be hard to change your diet, but even small changes can lower your risk of heart attack and heart disease. Follow-up care is a key part of your treatment and safety. Be sure to make and go to all appointments, and call your doctor if you are having problems. It's also a good idea to know your test results and keep a list of the medicines you take. How can you care for yourself at home? Watch your portions  · Learn what a serving is. A \"serving\" and a \"portion\" are not always the same thing. Make sure that you are not eating larger portions than are recommended. For example, a serving of pasta is ½ cup. A serving size of meat is 2 to 3 ounces. A 3-ounce serving is about the size of a deck of cards. Measure serving sizes until you are good at Iron" them. Keep in mind that restaurants often serve portions that are 2 or 3 times the size of one serving. · To keep your energy level up and keep you from feeling hungry, eat often but in smaller portions. · Eat only the number of calories you need to stay at a healthy weight. If you need to lose weight, eat fewer calories than your body burns (through exercise and other physical activity). Eat more fruits and vegetables  · Eat a variety of fruit and vegetables every day. Dark green, deep orange, red, or yellow fruits and vegetables are especially good for you. Examples include spinach, carrots, peaches, and berries. · Keep carrots, celery, and other veggies handy for snacks. Buy fruit that is in season and store it where you can see it so that you will be tempted to eat it. · Cook dishes that have a lot of veggies in them, such as stir-fries and soups. Limit saturated and trans fat  · Read food labels, and try to avoid saturated and trans fats. They increase your risk of heart disease. · Use olive or canola oil when you cook. · Bake, broil, grill, or steam foods instead of frying them. · Choose lean meats instead of high-fat meats such as hot dogs and sausages. Cut off all visible fat when you prepare meat. · Eat fish, skinless poultry, and meat alternatives such as soy products instead of high-fat meats. Soy products, such as tofu, may be especially good for your heart. · Choose low-fat or fat-free milk and dairy products. Eat foods high in fiber  · Eat a variety of grain products every day. Include whole-grain foods that have lots of fiber and nutrients. Examples of whole-grain foods include oats, whole wheat bread, and brown rice. · Buy whole-grain breads and cereals, instead of white bread or pastries. Limit salt and sodium  · Limit how much salt and sodium you eat to help lower your blood pressure. · Taste food before you salt it. Add only a little salt when you think you need it. With time, your taste buds will adjust to less salt. · Eat fewer snack items, fast foods, and other high-salt, processed foods. Check food labels for the amount of sodium in packaged foods. · Choose low-sodium versions of canned goods (such as soups, vegetables, and beans). Limit sugar  · Limit drinks and foods with added sugar. These include candy, desserts, and soda pop. Limit alcohol  · Limit alcohol to no more than 2 drinks a day for men and 1 drink a day for women. Too much alcohol can cause health problems. When should you call for help? Watch closely for changes in your health, and be sure to contact your doctor if:    · You would like help planning heart-healthy meals. Where can you learn more? Go to https://chpepiceweb.healthSpeedshape. org and sign in to your [employer] account. Enter V137 in the GetMaid box to learn more about \"Heart-Healthy Diet: Care Instructions. \"     If you do not have an account, please click on the \"Sign Up Now\" link. Current as of: August 22, 2019               Content Version: 12.6  © 6363-9890 [employer]. Care instructions adapted under license by [facility] ([facility]). If you have questions about a medical condition or this instruction, always ask your healthcare professional. [place] [address] any warranty or liability for your use of this information.

## 2021-07-07 ENCOUNTER — TELEPHONE (OUTPATIENT)
Dept: CARDIOLOGY CLINIC | Age: 61
End: 2021-07-07

## 2021-07-07 NOTE — TELEPHONE ENCOUNTER
pts wife called to let us know that pt has been taking off of his atorvastatin due to his liver enzymes being elevated and muscle aches    As of 6/29/21 pts:    AST  268  ALT  284

## 2021-07-20 RX ORDER — BEMPEDOIC ACID AND EZETIMIBE 180; 10 MG/1; MG/1
1 TABLET, FILM COATED ORAL DAILY
Qty: 30 TABLET | Refills: 5 | Status: SHIPPED | OUTPATIENT
Start: 2021-07-20 | End: 2021-10-28

## 2021-07-20 NOTE — TELEPHONE ENCOUNTER
Called patient with recommendations. Samples prepared at THE [facility] office for patient .  Rx sent to Dr. Meet Reaves for approval.

## 2021-07-20 NOTE — TELEPHONE ENCOUNTER
Patient may be able to take Nexlizet since his elevated enzymes qualify him as statin intolerant. Do yo have any samples there?

## 2021-07-20 NOTE — TELEPHONE ENCOUNTER
Dr. Agustín Dennis, please advise in Dr. Andreea Morales absence if patient may try Nexlizet since his liver enzymes are elevated as a result of statin therapy ( ,  on 6/29/21). Patient last seen in office 2/25/21 and has a hx of CAD, CABG x3, ischemic CM with restored LVEF, SANTINO, COPD and HLD. Previously on Lipitor 40 mg daily.

## 2021-07-22 ENCOUNTER — TELEPHONE (OUTPATIENT)
Dept: CARDIOLOGY CLINIC | Age: 61
End: 2021-07-22

## 2021-07-22 NOTE — TELEPHONE ENCOUNTER
Called patient. Spoke with both patient and his wife Gerardo Gardner per patient request.  Patient said that he is in the donut hole now and they can not afford Nexlizet. I requested they call [facility] [phone] to see if they qualify for patient assistance. Wife Gerardo Gardner returned call and said that patient was approved over the telephone today. They know to call office if anything further is needed from us. Patient and wife were both very grateful for the help.

## 2021-07-22 NOTE — TELEPHONE ENCOUNTER
Per Rima Michael at [employer], medication will cost $93.65 for a 30 day supply. Prince Jacobs, please contact patient to notify and if this is not affordable, please begin process for patient assistance. This can be done online at [facility]. org and you will need yearly income, thanks. Samples were prepared for patient  at our [facility] office. We can provide more samples if need be until we hear back about patient assistance.

## 2021-07-22 NOTE — TELEPHONE ENCOUNTER
Patient called me back. He said that he is in his donut hole right. He also is not home. He will call me later today when he gets home.

## 2021-07-22 NOTE — TELEPHONE ENCOUNTER
Called pharmacy and informed by Ken Maldonado that a PA is needed for Nexlizet. VIRGIL Snow will facilitate.

## 2021-08-13 ENCOUNTER — TELEPHONE (OUTPATIENT)
Dept: CARDIOLOGY CLINIC | Age: 61
End: 2021-08-13

## 2021-08-13 NOTE — TELEPHONE ENCOUNTER
Called patient with message from [facility]. Patient verbalized and confirmed understanding.

## 2021-10-18 PROBLEM — I10 ESSENTIAL HYPERTENSION: Status: ACTIVE | Noted: 2021-10-18

## 2021-10-18 NOTE — PROGRESS NOTES
[address]      Cardiology Consult    Ruthie Clements  1960    October 28, 2021    Primary Physician: Dr. Chantelle Quiles  Reason for visit: CAD s/p CABG and CHF    CC: \"musles still weak, learning to walk again\"     HPI:  The patient is 62 y.o. male with a past medical history significant for CAD s/p NSTEMI 11/2014 and CABG x3 with LIMA->LAD, SVG-->RI-->OM, ischemic CM with restored LVEF, chronic chest pains, SANTINO, COPD, and HLD who presents for chronic CAD management. He initially presented with NSTEMI 11/2014 with diffuse severe chest pain and associated left arm pain. He denies any interventions since CABG but last had a stress test in 2018 secondary to recurrent chest pains. He endorses left sided chest pain when shoveling snow last week that lasted 10 minutes without radiation. With normal activity, he denies exertional chest pain. He also has intermittent but chronic left sternal discomfort. Both the left sided and sternal pains have occurred intermittently since surgery. Pt was admitted to [place] [address]. 9/2021 for \"immune mediated necrotizing myopathy\" and seen by rheumatology. His methotrexate dose was increased and he was also treated with steroids and antibiotics. He was advised to stop taking ASA, statin and ACE-I. He states that he was told his myopathy was related to atorvastatin but he was not actually taking the medication when severe symptoms started. He has since resumed his ASA. Today, he states that he continues to have muscle weakness and is unable to walk longer distances. He uses a wheelchair and is able to walk shorted distances with the assistance of a walker. He participated in physical therapy and states that he had to learn to walk again. He denies any changes in intensity, frequency, or duration of his chest pains. He has chronic KRISHNAMURTHY but also denies acute changes. Patient reports compliance to his medications.  Patient denies PND, orthopnea, palpitations, lightheadedness, weight changes, changes in LE edema, and syncope. He is compliant with his medications. Past Medical History:   Diagnosis Date    Abnormal EKG     [facility]     Echo 2014 LVEF 35-40%    Chest pain     [facility]     COPD (chronic obstructive pulmonary disease) ([facility]     Coronary artery disease 14    s/p NSTEMI, Cath 99% LM lesion, s/p CABG    Essential hypertension 10/18/2021    Family history of early CAD     GERD (gastroesophageal reflux disease)     HLD (hyperlipidemia)     Osteoarthritis     Tobacco use disorder     Cessation discussed    Tobacco use disorder     Ulcer     hx of. Past Surgical History:   Procedure Laterality Date    CARPAL TUNNEL RELEASE      CATARACT REMOVAL      CORONARY ARTERY BYPASS GRAFT      CORONARY ARTERY BYPASS GRAFT      ELBOW SURGERY      EYE SURGERY  1965    SHOULDER SURGERY      SKIN CANCER EXCISION       Family History   Problem Relation Age of Onset    High Blood Pressure Mother     Heart Disease Mother     Diabetes Mother      Social History     Tobacco Use    Smoking status: Former Smoker     Packs/day: 1.50     Years: 40.00     Pack years: 60.00     Types: Cigarettes     Quit date: 2014     Years since quittin.9    Smokeless tobacco: Never Used    Tobacco comment: Quit with surgery   Vaping Use    Vaping Use: Never used   Substance Use Topics    Alcohol use: No    Drug use: No     Allergies   Allergen Reactions    Latex Rash    Atorvastatin      Immune mediated necrotizing myopathy     Current Outpatient Medications   Medication Sig Dispense Refill    folic acid (FOLVITE) 1 MG tablet Take 1 mg by mouth daily      traZODone (DESYREL) 50 MG tablet Take 50 mg by mouth nightly      docusate sodium (COLACE) 100 MG capsule Take 100 mg by mouth 2 times daily      oxyCODONE (OXYCONTIN) 10 MG extended release tablet Take 10 mg by mouth every 12 hours.       carvedilol (COREG) 3.125 MG tablet One tablet bid 180 tablet 3    nitroGLYCERIN (NITROSTAT) 0.4 MG SL tablet Place 1 tablet under the tongue every 5 minutes as needed for Chest pain 25 tablet 3    Multiple Vitamin (MULTI-DAY PO) Take by mouth      budesonide-formoterol (SYMBICORT) 160-4.5 MCG/ACT AERO Inhale 2 puffs into the lungs 2 times daily      albuterol sulfate  (90 BASE) MCG/ACT inhaler Inhale 2 puffs into the lungs every 4 hours as needed for Wheezing      predniSONE (DELTASONE) 10 MG tablet Take 10 mg by mouth daily      aspirin 81 MG tablet Take 81 mg by mouth daily      fluticasone (FLONASE) 50 MCG/ACT nasal spray 2 sprays by Nasal route daily      Tiotropium Bromide Monohydrate (SPIRIVA HANDIHALER IN) Inhale into the lungs      pantoprazole (PROTONIX) 40 MG tablet Take 40 mg by mouth daily. No current facility-administered medications for this visit. Review of Systems:  · Constitutional: No unanticipated weight loss. There's been no change in energy level, sleep pattern, or activity level. No fevers, chills. · Eyes: No visual changes or diplopia. No scleral icterus. · ENT: No Headaches, hearing loss or vertigo. No mouth sores or sore throat. · Cardiovascular: as reviewed in HPI  · Respiratory: No cough or wheezing, no sputum production. No hemoptysis. · Gastrointestinal: No abdominal pain, appetite loss, blood in stools. No change in bowel or bladder habits. · Genitourinary: No dysuria, trouble voiding, or hematuria. · Musculoskeletal:  No gait disturbance, no joint complaints. · Integumentary: No rash or pruritis. · Neurological: No headache, diplopia, change in muscle strength, numbness or tingling. · Psychiatric: No anxiety or depression. · Endocrine: No temperature intolerance. No excessive thirst, fluid intake, or urination. No tremor. · Hematologic/Lymphatic: No abnormal bruising or bleeding, blood clots or swollen lymph nodes.   · Allergic/Immunologic: No nasal congestion or hives.    Physical Exam:   /86   Pulse 93   Ht 5' 8\" (1.727 m)   Wt 223 lb (101.2 kg)   SpO2 97%   BMI 33.91 kg/m²   Wt Readings from Last 3 Encounters:   10/28/21 223 lb (101.2 kg)   02/25/21 241 lb (109.3 kg)   09/17/20 244 lb 6.4 oz (110.9 kg)     Constitutional: He is oriented to person, place, and time. He appears well-developed and well-nourished. In no acute distress. Head: Normocephalic and atraumatic. Pupils equal and round. Neck: Neck supple. No JVP or carotid bruit appreciated. No mass and no thyromegaly present. No lymphadenopathy present. Cardiovascular: Normal rate. Normal heart sounds. Exam reveals no gallop and no friction rub. No murmur heard. Pulmonary/Chest: Effort normal. No respiratory distress. Diminished breath sounds with diffuse bilateral wheezes. Abdominal: Soft, non-tender. Bowel sounds are normal. He exhibits no organomegaly, mass or bruit. Extremities: No edema. No cyanosis or clubbing. Pulses are 2+ radial/carotid bilaterally. Neurological: No gross cranial nerve deficit. Coordination normal.   Skin: Skin is warm and dry. There is no rash or diaphoresis. Psychiatric: He has a normal mood and affect. His speech is normal and behavior is normal.     Lab Review:   FLP:  5/2020 , HDL 31, LDL 77            12/2020 , HDL 35, LDL 80.              6/2021 TG 86, HDL 36,   Lab Results   Component Value Date    TRIG 64 11/11/2014    HDL 42 11/11/2014    LDLCALC 98 11/11/2014    LABVLDL 13 11/11/2014     BUN/Creatinine:    Lab Results   Component Value Date    BUN 26 11/19/2014    CREATININE 0.9 11/19/2014 5/2020 BUN 20, Cr 1.19. K 4.4. ALT 54, AST 65, Alk phos normal.   12/2020 BUN 16, Cr 1.0. K 4.4. H/H normal.   10/2021 BUN 21, Cr 0.67. K 4.7. H/H 11.4, 35.9. ALT 84, AST 44, Alk Phos 38. EKG Interpretation:   10/28/21 NSR, normal EK. Event recorder 7/2016 no arrhythmias. Image Review:     11/2014 Cath findings  1.   Severe left main coronary artery disease of 99%. There is 25% plaque disease otherwise in the left anterior descending coronary artery and circumflex. This is a right dominant coronary arterial system with a 60% proximal right coronary artery lesion. There are collaterals to the left system from the right, which is a large vessel. 2.  Moderately severe left ventricular systolic dysfunction with anterior wall hypokinesis in the mid and distal portions in the apex. Left ventricle ejection fraction 35%. 3.  No gradient across the aortic valve on pullback to assess the aortic stenosis. 4.  Moderately severe left ventricular diastolic dysfunction with an left ventricle end-diastolic pressure of 29 mmHg. s/p CABG x3 LIMA->LAD, SVG->RI->OM. Plain GXT 4/2015 no ischemia. Stress test 10/10/18 normal stress test with no ischemia      Echo 11/2014 LVEF 40-45%, mild-mod MR. Echo 10/2017 LVEF normal, grade I DD, mildly reduced RVSF. Assessment/Plan:     1. CAD s/p CABG (2014) with chronic angina. Patient describes both chronic exertional chest pains and chest wall pains. Continue with medical management and risk factor modification including ASA and B-blocker. Statin and Nexlizet discontinued with myopathy. Will prescribe NTG SL and instructed pt in use. Advised to call if having chest pains of increasing frequency, duration, or intensity. Consider PCSK9 inhibitor when recovers from recent illness. 2. Ischemic CM with restored LVEF. Patient appears compensated. Continue BBlocker. ACE-I dose previously lowered secondary to hyperkalemia. ACE-I stopped in view of immune mediated necrotizing myopathy     3. Hyperlipidemia.  (6/2021). Statin stopped prior to his myopathy hospitalization and Nexlizet 180/10mg daily prescribed 7/2021. Advised to stop nexlizet in view of myopathy until pt fully recovers. Consider PCSK9 Inhibitor. 4. COPD/SANTINO.  Steroids prescribed daily but taking steroids prn, using BiPAP and nebulizer. Followed by pulmonary. 5. Essential hypertension. Controlled. Goal BP <130/80. Continue medical therapy. 6.  Immune mediated necrotizing myopathy. Management per rheumatology. F/u in office in 3 months    Thank you very much for allowing me to participate in the care of your patient. Please do not hesitate to contact me if you have any questions. Sincerely,  Anderson Guzman. Rama Brown, [address] [place] [address] [place] [address]  Ph: [phone]  Fax: [phone]    This note was scribed in the presence of the physician by Bryan Klein RN. Physician Attestation: The scribes documentation has been prepared under my direction and personally reviewed by me in its entirety. I confirm that the note above accurately reflects all work, treatment, procedures, and medical decision making performed by me. All portions of the note including but not limited to the chief complaint, history of present illness, physical exam, assessment and plan/medical decision making were personally reviewed, edited, and updated on the day of the visit.

## 2021-10-28 ENCOUNTER — OFFICE VISIT (OUTPATIENT)
Dept: CARDIOLOGY CLINIC | Age: 61
End: 2021-10-28
Payer: MEDICARE

## 2021-10-28 VITALS
BODY MASS INDEX: 33.8 KG/M2 | OXYGEN SATURATION: 97 % | WEIGHT: 223 LBS | HEART RATE: 93 BPM | SYSTOLIC BLOOD PRESSURE: 128 MMHG | DIASTOLIC BLOOD PRESSURE: 86 MMHG | HEIGHT: 68 IN

## 2021-10-28 DIAGNOSIS — I10 ESSENTIAL HYPERTENSION: ICD-10-CM

## 2021-10-28 DIAGNOSIS — Z95.1 HX OF CABG: ICD-10-CM

## 2021-10-28 DIAGNOSIS — I25.118 CORONARY ARTERY DISEASE OF NATIVE ARTERY OF NATIVE HEART WITH STABLE ANGINA PECTORIS (HCC): Primary | ICD-10-CM

## 2021-10-28 DIAGNOSIS — I25.5 ISCHEMIC CARDIOMYOPATHY: ICD-10-CM

## 2021-10-28 DIAGNOSIS — E78.00 PURE HYPERCHOLESTEROLEMIA: ICD-10-CM

## 2021-10-28 PROCEDURE — 1036F TOBACCO NON-USER: CPT | Performed by: INTERNAL MEDICINE

## 2021-10-28 PROCEDURE — G8484 FLU IMMUNIZE NO ADMIN: HCPCS | Performed by: INTERNAL MEDICINE

## 2021-10-28 PROCEDURE — 99214 OFFICE O/P EST MOD 30 MIN: CPT | Performed by: INTERNAL MEDICINE

## 2021-10-28 PROCEDURE — 3017F COLORECTAL CA SCREEN DOC REV: CPT | Performed by: INTERNAL MEDICINE

## 2021-10-28 PROCEDURE — G8427 DOCREV CUR MEDS BY ELIG CLIN: HCPCS | Performed by: INTERNAL MEDICINE

## 2021-10-28 PROCEDURE — 93000 ELECTROCARDIOGRAM COMPLETE: CPT | Performed by: INTERNAL MEDICINE

## 2021-10-28 PROCEDURE — G8417 CALC BMI ABV UP PARAM F/U: HCPCS | Performed by: INTERNAL MEDICINE

## 2021-10-28 RX ORDER — FOLIC ACID 1 MG/1
1 TABLET ORAL DAILY
COMMUNITY

## 2021-10-28 RX ORDER — TRAZODONE HYDROCHLORIDE 50 MG/1
50 TABLET ORAL NIGHTLY
COMMUNITY

## 2021-10-28 NOTE — PATIENT INSTRUCTIONS
Patient Education        Limiting Sodium With Heart Failure: Care Instructions  Your Care Instructions     Sodium causes your body to hold on to extra water. This may cause your heart failure symptoms to get worse. Limiting sodium may help you feel better. People get most of their sodium from processed foods. Fast food and restaurant meals also tend to be very high in sodium. Your doctor may suggest that you limit sodium. Your doctor can tell you how much sodium is right for you. An example is less than 3,000 mg a day. This includes all the salt you eat in cooked or packaged foods. Follow-up care is a key part of your treatment and safety. Be sure to make and go to all appointments, and call your doctor if you are having problems. It's also a good idea to know your test results and keep a list of the medicines you take. How can you care for yourself at home? Read food labels  · Read food labels on cans and food packages. The labels tell you how much sodium is in each serving. Make sure that you look at the serving size. If you eat more than the serving size, you have eaten more sodium than is listed for one serving. · Food labels also tell you the Percent Daily Value for sodium. Choose products with low Percent Daily Values for sodium. · Be aware that sodium can come in forms other than salt, including monosodium glutamate (MSG), sodium citrate, and sodium bicarbonate (baking soda). MSG is often added to Asian food. You can sometimes ask for food without MSG or salt. Buy low-sodium foods  · Buy foods that are labeled \"unsalted\" (no salt added), \"sodium-free\" (less than 5 mg of sodium per serving), or \"low-sodium\" (140 mg or less of sodium per serving). A food labeled \"light sodium\" has less than half of the full-sodium version of that food. Foods labeled \"reduced-sodium\" may still have too much sodium. · Buy fresh vegetables or plain, frozen vegetables.  Buy low-sodium versions of canned vegetables, soups, and other canned goods. Prepare low-sodium meals  · Use less salt each day when cooking. Reducing salt in this way will help you adjust to the taste. Do not add salt after cooking. Take the salt shaker off the table. · Flavor your food with garlic, lemon juice, onion, vinegar, herbs, and spices instead of salt. Do not use soy sauce, steak sauce, onion salt, garlic salt, or ketchup on your food. · Make your own salad dressings, sauces, and ketchup without adding salt. · Use less salt (or none) when recipes call for it. You can often use half the salt a recipe calls for without losing flavor. Other dishes like rice, pasta, and grains do not need added salt. · Rinse canned vegetables. This removes somebut not allof the salt. · Avoid water that has a naturally high sodium content or that has been treated with water softeners, which add sodium. If you buy bottled water, read the label and choose a sodium-free brand. Avoid high-sodium foods, such as:  · Smoked, cured, salted, and canned meat, fish, and poultry. · Ham, anne, hot dogs, and luncheon meats. · Regular, hard, and processed cheese and regular peanut butter. · Crackers with salted tops. · Frozen prepared meals. · Canned and dried soups, broths, and bouillon, unless labeled sodium-free or low-sodium. · Canned vegetables, unless labeled sodium-free or low-sodium. · Salted snack foods such as chips and pretzels. · [place] fries, pizza, tacos, and other fast foods. · Pickles, olives, ketchup, and other condiments, especially soy sauce, unless labeled sodium-free or low-sodium. If you cannot cook for yourself  · Have family members or friends help you, or have someone cook low-sodium meals. · Check with your local senior nutrition program to find out where meals are served and whether they offer a low-sodium option. You can often find these programs through your local health department or hospital.  · Have meals delivered to your home.  Most [facility] have a Meals on [employer] [place] [place]. These programs provide one hot meal a day for older adults, delivered to their homes. Ask whether these meals are low-sodium. Let them know that you are on a low-sodium diet. Where can you learn more? Go to https://chpejuarezeweb.[employer]. org and sign in to your [employer] account. Enter A166 in the [facility] box to learn more about \"Limiting Sodium With Heart Failure: Care Instructions. \"     If you do not have an account, please click on the \"Sign Up Now\" link. Current as of: April 29, 2021               Content Version: 13.0  © 4283-9600 [employer], [employer]. Care instructions adapted under license by [facility] [place] ([facility]). If you have questions about a medical condition or this instruction, always ask your healthcare professional. [address] any warranty or liability for your use of this information.

## 2021-12-10 NOTE — PROGRESS NOTES
[facility]      Cardiology Consult    Joe Bhakta  1960    December 23, 2021    Primary Physician: Dr. Shirley Varela  Reason for visit: CAD s/p CABG and CHF    CC: \"lightheaded spells\"      HPI:  The patient is 62 y.o. male with a past medical history significant for CAD s/p NSTEMI 11/2014 and CABG x3 with LIMA->LAD, SVG-->RI-->OM, ischemic CM with restored LVEF, chronic chest pains, SANTINO, COPD, and HLD who presents for chronic CAD management. He initially presented with NSTEMI 11/2014 with diffuse severe chest pain and associated left arm pain. He denies any interventions since CABG but last had a stress test in 2018 secondary to recurrent chest pains. He endorses left sided chest pain when shoveling snow last week that lasted 10 minutes without radiation. With normal activity, he denies exertional chest pain. He also has intermittent but chronic left sternal discomfort. Both the left sided and sternal pains have occurred intermittently since surgery. Pt was admitted to [place] [address]. 9/2021 for \"immune mediated necrotizing myopathy\" and seen by rheumatology. His methotrexate dose was increased and he was also treated with steroids and antibiotics. He was advised to stop taking ASA, statin and ACE-I. He states that he was told his myopathy was related to atorvastatin but he was not actually taking the medication when severe symptoms started. He has since resumed his ASA. He states that he continues to have muscle weakness and is unable to walk longer distances. He participated in physical therapy and states that he had to learn to walk again. Today, he presents for evaluation of \"dizziness\" which he describes as being lightheaded. He feels these episodes seem to occur 30-40 minutes after taking his medications but denies syncope. He will check his BP with the episodes and it has been normal. The spells first started after his myopathy diagnosis.  He is uncertain if they are more frequent as the steroid dose is being tapered down. He states that he saw his PCP who prescribed Meclizine and told him to follow up with cardiology. He denies the sensation of the room spinning. He monitors his BP at home and on average it runs [id number] systolic. He is taking Mucinex for a productive cough. He did not test for COVID but his wife was negative with similar chest cold symptoms. Overall, his generalized weakness has improved and he no longer uses a wheelchair except for longer distances but continues to ambulate with the assistance of walker. He denies any changes in intensity, frequency, or duration of his chest pains. He has chronic KRISHNAMURTHY but also denies acute changes. Patient denies PND, orthopnea, palpitations, weight changes, changes in LE edema, and syncope. He is compliant with his medications. Past Medical History:   Diagnosis Date    Abnormal EKG     Cardiomyopathy ([facility]     Echo 11/2014 LVEF 35-40%    Chest pain     CHF (congestive heart failure) (HCC)     COPD (chronic obstructive pulmonary disease) ([facility]     Coronary artery disease 11/11/14    s/p NSTEMI, Cath 99% LM lesion, s/p CABG    Essential hypertension 10/18/2021    Family history of early CAD     GERD (gastroesophageal reflux disease)     HLD (hyperlipidemia)     Osteoarthritis     Tobacco use disorder     Cessation discussed    Tobacco use disorder     Ulcer     hx of.       Past Surgical History:   Procedure Laterality Date    CARPAL TUNNEL RELEASE      CATARACT REMOVAL      CORONARY ARTERY BYPASS GRAFT      CORONARY ARTERY BYPASS GRAFT      ELBOW SURGERY      EYE SURGERY  1965    SHOULDER SURGERY      SKIN CANCER EXCISION       Family History   Problem Relation Age of Onset    High Blood Pressure Mother     Heart Disease Mother     Diabetes Mother      Social History     Tobacco Use    Smoking status: Former Smoker     Packs/day: 1.50     Years: 40.00     Pack years: 60.00     Types: Cigarettes     Quit date: 2014     Years since quittin.1    Smokeless tobacco: Never Used    Tobacco comment: Quit with surgery   Vaping Use    Vaping Use: Never used   Substance Use Topics    Alcohol use: No    Drug use: No     Allergies   Allergen Reactions    Latex Rash    Atorvastatin      Immune mediated necrotizing myopathy     Current Outpatient Medications   Medication Sig Dispense Refill    folic acid (FOLVITE) 1 MG tablet Take 1 mg by mouth daily      traZODone (DESYREL) 50 MG tablet Take 50 mg by mouth nightly      docusate sodium (COLACE) 100 MG capsule Take 100 mg by mouth 2 times daily      oxyCODONE (OXYCONTIN) 10 MG extended release tablet Take 10 mg by mouth every 12 hours.  carvedilol (COREG) 3.125 MG tablet One tablet bid 180 tablet 3    nitroGLYCERIN (NITROSTAT) 0.4 MG SL tablet Place 1 tablet under the tongue every 5 minutes as needed for Chest pain 25 tablet 3    Multiple Vitamin (MULTI-DAY PO) Take by mouth      budesonide-formoterol (SYMBICORT) 160-4.5 MCG/ACT AERO Inhale 2 puffs into the lungs 2 times daily      albuterol sulfate  (90 BASE) MCG/ACT inhaler Inhale 2 puffs into the lungs every 4 hours as needed for Wheezing      predniSONE (DELTASONE) 10 MG tablet Take 10 mg by mouth daily      aspirin 81 MG tablet Take 81 mg by mouth daily      fluticasone (FLONASE) 50 MCG/ACT nasal spray 2 sprays by Nasal route daily      Tiotropium Bromide Monohydrate (SPIRIVA HANDIHALER IN) Inhale into the lungs      pantoprazole (PROTONIX) 40 MG tablet Take 40 mg by mouth daily. No current facility-administered medications for this visit. Review of Systems:  · Constitutional: No unanticipated weight loss. There's been no change in energy level, sleep pattern, or activity level. No fevers, chills. · Eyes: No visual changes or diplopia. No scleral icterus. · ENT: No Headaches, hearing loss or vertigo. No mouth sores or sore throat.   · Cardiovascular: as reviewed in HPI  · Respiratory: No cough or wheezing, no sputum production. No hemoptysis. · Gastrointestinal: No abdominal pain, appetite loss, blood in stools. No change in bowel or bladder habits. · Genitourinary: No dysuria, trouble voiding, or hematuria. · Musculoskeletal:  No gait disturbance, no joint complaints. · Integumentary: No rash or pruritis. · Neurological: No headache, diplopia, change in muscle strength, numbness or tingling. · Psychiatric: No anxiety or depression. · Endocrine: No temperature intolerance. No excessive thirst, fluid intake, or urination. No tremor. · Hematologic/Lymphatic: No abnormal bruising or bleeding, blood clots or swollen lymph nodes. · Allergic/Immunologic: No nasal congestion or hives. Physical Exam:   BP (!) 142/86   Pulse 83   Ht 5' 8\" (1.727 m)   Wt 244 lb (110.7 kg)   SpO2 97%   BMI 37.10 kg/m²   Wt Readings from Last 3 Encounters:   12/23/21 244 lb (110.7 kg)   10/28/21 223 lb (101.2 kg)   02/25/21 241 lb (109.3 kg)     Constitutional: He is oriented to person, place, and time. He appears well-developed and well-nourished. In no acute distress. Head: Normocephalic and atraumatic. Pupils equal and round. Neck: Neck supple. No JVP or carotid bruit appreciated. No mass and no thyromegaly present. No lymphadenopathy present. Cardiovascular: Normal rate. Normal heart sounds. Exam reveals no gallop and no friction rub. No murmur heard. Pulmonary/Chest: Effort normal. No respiratory distress. Diminished breath sounds with diffuse bilateral wheezes. Abdominal: Soft, non-tender. Bowel sounds are normal. He exhibits no organomegaly, mass or bruit. Extremities: No edema. No cyanosis or clubbing. Pulses are 2+ radial/carotid bilaterally. Neurological: No gross cranial nerve deficit. Coordination normal.   Skin: Skin is warm and dry. There is no rash or diaphoresis. Psychiatric: He has a normal mood and affect.  His speech is normal and behavior is normal. Lab Review:   FLP:  5/2020 , HDL 31, LDL 77            12/2020 , HDL 35, LDL 80.              6/2021 TG 86, HDL 36,   Lab Results   Component Value Date    TRIG 64 11/11/2014    HDL 42 11/11/2014    LDLCALC 98 11/11/2014    LABVLDL 13 11/11/2014     BUN/Creatinine:    Lab Results   Component Value Date    BUN 26 11/19/2014    CREATININE 0.9 11/19/2014 5/2020 BUN 20, Cr 1.19. K 4.4. ALT 54, AST 65, Alk phos normal.   12/2020 BUN 16, Cr 1.0. K 4.4. H/H normal.   10/2021 BUN 21, Cr 0.67. K 4.7. H/H 11.4, 35.9. ALT 84, AST 44, Alk Phos 38.   12/2021 BUN 16, Cr 0.77. K 5.2. H/H 13.6, 44.4. . CRP 10.62. EKG Interpretation:   10/28/21 NSR, normal EK.   12/23/21    Event recorder 7/2016 no arrhythmias. Image Review:     11/2014 Cath findings  1. Severe left main coronary artery disease of 99%. There is 25% plaque disease otherwise in the left anterior descending coronary artery and circumflex. This is a right dominant coronary arterial system with a 60% proximal right coronary artery lesion. There are collaterals to the left system from the right, which is a large vessel. 2.  Moderately severe left ventricular systolic dysfunction with anterior wall hypokinesis in the mid and distal portions in the apex. Left ventricle ejection fraction 35%. 3.  No gradient across the aortic valve on pullback to assess the aortic stenosis. 4.  Moderately severe left ventricular diastolic dysfunction with an left ventricle end-diastolic pressure of 29 mmHg. s/p CABG x3 LIMA->LAD, SVG->RI->OM. Plain GXT 4/2015 no ischemia. Stress test 10/10/18 normal stress test with no ischemia      Echo 11/2014 LVEF 40-45%, mild-mod MR. Echo 10/2017 LVEF normal, grade I DD, mildly reduced RVSF. Assessment/Plan:     1. CAD s/p CABG (2014) with chronic angina. Patient describes both chronic exertional chest pains and chest wall pains.  Continue with medical management and risk factor modification including ASA and B-blocker. Statin and Nexlizet discontinued with myopathy. NTG SL prescribed and instructed pt in use. Advised to call if having chest pains of increasing frequency, duration, or intensity. Consider PCSK9 inhibitor when recovers from recent illness. 2. Ischemic CM with restored LVEF. Patient appears compensated. Continue BBlocker. ACE-I dose previously lowered secondary to hyperkalemia. ACE-I stopped in view of immune mediated necrotizing myopathy . Will repeat echocardiogram to evaluate progression of LVEF. 3. Hyperlipidemia.  (6/2021). Statin stopped prior to his myopathy hospitalization and Nexlizet 180/10mg daily prescribed 7/2021. Advised to stop nexlizet in view of myopathy until pt fully recovers. Consider PCSK9 Inhibitor. 4. COPD/SANTINO. Steroids prescribed daily but taking steroids prn, using BiPAP and nebulizer. Followed by pulmonary. 5. Essential hypertension. Controlled. Goal BP <130/80. Continue medical therapy. 6.  Immune mediated necrotizing myopathy. Management per rheumatology. 7. Lightheadedness. Doubt cardiac etiology as he has been in a normal rhythm and BP with spells is also normal.     F/u in office in 6 months    Thank you very much for allowing me to participate in the care of your patient. Please do not hesitate to contact me if you have any questions. Sincerely,  Chiki Crawford. Martin Candler, [address] [place] [address]  Ph: [phone]  Fax: [phone]    This note was scribed in the presence of the physician by Mitchell Lane RN. Physician Attestation: The scribes documentation has been prepared under my direction and personally reviewed by me in its entirety. I confirm that the note above accurately reflects all work, treatment, procedures, and medical decision making performed by me.    All portions of the note including but not limited to the chief complaint, history of present illness, physical exam, assessment and plan/medical decision making were personally reviewed, edited, and updated on the day of the visit.

## 2021-12-23 ENCOUNTER — OFFICE VISIT (OUTPATIENT)
Dept: CARDIOLOGY CLINIC | Age: 61
End: 2021-12-23
Payer: MEDICARE

## 2021-12-23 ENCOUNTER — TELEPHONE (OUTPATIENT)
Dept: CARDIOLOGY CLINIC | Age: 61
End: 2021-12-23

## 2021-12-23 ENCOUNTER — PROCEDURE VISIT (OUTPATIENT)
Dept: CARDIOLOGY CLINIC | Age: 61
End: 2021-12-23
Payer: MEDICARE

## 2021-12-23 VITALS
HEART RATE: 83 BPM | BODY MASS INDEX: 36.98 KG/M2 | HEIGHT: 68 IN | OXYGEN SATURATION: 97 % | SYSTOLIC BLOOD PRESSURE: 142 MMHG | WEIGHT: 244 LBS | DIASTOLIC BLOOD PRESSURE: 86 MMHG

## 2021-12-23 DIAGNOSIS — I10 ESSENTIAL HYPERTENSION: ICD-10-CM

## 2021-12-23 DIAGNOSIS — R42 LIGHTHEADEDNESS: ICD-10-CM

## 2021-12-23 DIAGNOSIS — Z95.1 HX OF CABG: ICD-10-CM

## 2021-12-23 DIAGNOSIS — I25.10 CORONARY ARTERY DISEASE INVOLVING NATIVE CORONARY ARTERY OF NATIVE HEART WITHOUT ANGINA PECTORIS: ICD-10-CM

## 2021-12-23 DIAGNOSIS — E78.00 PURE HYPERCHOLESTEROLEMIA: ICD-10-CM

## 2021-12-23 DIAGNOSIS — I25.118 CORONARY ARTERY DISEASE OF NATIVE ARTERY OF NATIVE HEART WITH STABLE ANGINA PECTORIS (HCC): Primary | ICD-10-CM

## 2021-12-23 DIAGNOSIS — R94.31 ABNORMAL EKG: ICD-10-CM

## 2021-12-23 DIAGNOSIS — I25.5 ISCHEMIC CARDIOMYOPATHY: ICD-10-CM

## 2021-12-23 LAB
LV EF: 58 %
LVEF MODALITY: NORMAL

## 2021-12-23 PROCEDURE — G8417 CALC BMI ABV UP PARAM F/U: HCPCS | Performed by: INTERNAL MEDICINE

## 2021-12-23 PROCEDURE — 93306 TTE W/DOPPLER COMPLETE: CPT | Performed by: INTERNAL MEDICINE

## 2021-12-23 PROCEDURE — G8484 FLU IMMUNIZE NO ADMIN: HCPCS | Performed by: INTERNAL MEDICINE

## 2021-12-23 PROCEDURE — 3017F COLORECTAL CA SCREEN DOC REV: CPT | Performed by: INTERNAL MEDICINE

## 2021-12-23 PROCEDURE — G8427 DOCREV CUR MEDS BY ELIG CLIN: HCPCS | Performed by: INTERNAL MEDICINE

## 2021-12-23 PROCEDURE — 93000 ELECTROCARDIOGRAM COMPLETE: CPT | Performed by: INTERNAL MEDICINE

## 2021-12-23 PROCEDURE — 1036F TOBACCO NON-USER: CPT | Performed by: INTERNAL MEDICINE

## 2021-12-23 PROCEDURE — 99214 OFFICE O/P EST MOD 30 MIN: CPT | Performed by: INTERNAL MEDICINE

## 2021-12-23 NOTE — TELEPHONE ENCOUNTER
Pt is having an echo done today in the office, called [facility], per wife this is the primary insurance, spoke with Pineda Burr who stated no prior [employer] Netter is needed for the code [id number].   The reference number for this call is [id number]

## 2021-12-23 NOTE — PATIENT INSTRUCTIONS

## 2022-03-24 RX ORDER — CARVEDILOL 3.12 MG/1
TABLET ORAL
Qty: 180 TABLET | Refills: 3 | Status: SHIPPED | OUTPATIENT
Start: 2022-03-24

## 2023-02-22 NOTE — PROGRESS NOTES
[address]      Cardiology Consult    Betsy Pace  1960    February 23, 2023    Primary Physician: Dr. Ryley Benavidez  Reason for visit: CAD s/p CABG and CHF    CC: \"Feeling good. \"     HPI:  The patient is 65 y.o. male with a past medical history significant for CAD s/p NSTEMI 11/2014 and CABG x3 with LIMA->LAD, SVG->RI->OM, ischemic CM with restored LVEF, chronic chest pains, SANTINO, COPD, and HLD who presents for chronic CAD management. He initially presented with NSTEMI 11/2014 with diffuse severe chest pain and associated left arm pain. He denies any interventions since CABG but last had a stress test in 2018 secondary to recurrent chest pains. He endorses left sided chest pain when shoveling snow last week that lasted 10 minutes without radiation. With normal activity, he denies exertional chest pain. He also has intermittent but chronic left sternal discomfort. Both the left sided and sternal pains have occurred intermittently since surgery. Pt was admitted to [place] [address]. 9/2021 for \"immune mediated necrotizing myopathy\" and seen by rheumatology. His methotrexate dose was increased and he was also treated with steroids and antibiotics. He was advised to stop taking ASA, statin and ACE-I. He states that he was told his myopathy was related to atorvastatin but he was not actually taking the medication when severe symptoms started. He has since resumed his ASA. He states that he continues to have muscle weakness and is unable to walk longer distances. He participated in physical therapy and states that he had to learn to walk again. Today, he states overall he is doing well. He denies any new cardiac complaints and states he continues to remain active without any exertional symptoms. He denies any chest pains or worsening shortness of breath. He reports compliance with his medications and tolerating. He denies any abnormal bleeding or bruising.  Patient denies exertional chest pain/pressure, dyspnea at rest, worsening KRISHNAMURTHY, PND, orthopnea, palpitations, lightheadedness, weight changes, changes in LE edema, and syncope. Past Medical History:   Diagnosis Date    Abnormal EKG     Cardiomyopathy ([facility]     Echo 2014 LVEF 35-40%    Chest pain     CHF (congestive heart failure) (HCC)     COPD (chronic obstructive pulmonary disease) ([facility]     Coronary artery disease 14    s/p NSTEMI, Cath 99% LM lesion, s/p CABG    Essential hypertension 10/18/2021    Family history of early CAD     GERD (gastroesophageal reflux disease)     HLD (hyperlipidemia)     Osteoarthritis     Tobacco use disorder     Cessation discussed    Tobacco use disorder     Ulcer     hx of.       Past Surgical History:   Procedure Laterality Date    CARPAL TUNNEL RELEASE      CATARACT REMOVAL      CORONARY ARTERY BYPASS GRAFT      CORONARY ARTERY BYPASS GRAFT      ELBOW SURGERY      EYE SURGERY  1965    SHOULDER SURGERY      SKIN CANCER EXCISION       Family History   Problem Relation Age of Onset    High Blood Pressure Mother     Heart Disease Mother     Diabetes Mother      Social History     Tobacco Use    Smoking status: Former     Packs/day: 1.50     Years: 40.00     Pack years: 60.00     Types: Cigarettes     Quit date: 2014     Years since quittin.2    Smokeless tobacco: Never    Tobacco comments:     Quit with surgery   Vaping Use    Vaping Use: Never used   Substance Use Topics    Alcohol use: No    Drug use: No     Allergies   Allergen Reactions    Latex Rash    Adhesive Tape Rash and Hives    Atorvastatin      Immune mediated necrotizing myopathy     Current Outpatient Medications   Medication Sig Dispense Refill    methotrexate (RHEUMATREX) 2.5 MG chemo tablet Take 25 mg by mouth once a week      carvedilol (COREG) 3.125 MG tablet One tablet bid 992 tablet 3    folic acid (FOLVITE) 1 MG tablet Take 1 mg by mouth daily      traZODone (DESYREL) 50 MG tablet Take 50 mg by mouth nightly      docusate sodium (COLACE) 100 MG capsule Take 100 mg by mouth 2 times daily      oxyCODONE (OXYCONTIN) 10 MG extended release tablet Take 10 mg by mouth every 12 hours. nitroGLYCERIN (NITROSTAT) 0.4 MG SL tablet Place 1 tablet under the tongue every 5 minutes as needed for Chest pain 25 tablet 3    Multiple Vitamin (MULTI-DAY PO) Take by mouth      budesonide-formoterol (SYMBICORT) 160-4.5 MCG/ACT AERO Inhale 2 puffs into the lungs 2 times daily      albuterol sulfate  (90 BASE) MCG/ACT inhaler Inhale 2 puffs into the lungs every 4 hours as needed for Wheezing      aspirin 81 MG tablet Take 81 mg by mouth daily      fluticasone (FLONASE) 50 MCG/ACT nasal spray 2 sprays by Nasal route daily      pantoprazole (PROTONIX) 40 MG tablet Take 40 mg by mouth daily. No current facility-administered medications for this visit. Review of Systems:  Constitutional: No unanticipated weight loss. There's been no change in energy level, sleep pattern, or activity level. No fevers, chills. Eyes: No visual changes or diplopia. No scleral icterus. ENT: No Headaches, hearing loss or vertigo. No mouth sores or sore throat. Cardiovascular: as reviewed in HPI  Respiratory: No cough or wheezing, no sputum production. No hemoptysis. Gastrointestinal: No abdominal pain, appetite loss, blood in stools. No change in bowel or bladder habits. Genitourinary: No dysuria, trouble voiding, or hematuria. Musculoskeletal:  No gait disturbance, no joint complaints. Integumentary: No rash or pruritis. Neurological: No headache, diplopia, change in muscle strength, numbness or tingling. Psychiatric: No anxiety or depression. Endocrine: No temperature intolerance. No excessive thirst, fluid intake, or urination. No tremor. Hematologic/Lymphatic: No abnormal bruising or bleeding, blood clots or swollen lymph nodes. Allergic/Immunologic: No nasal congestion or hives.     Physical Exam:   /68 (Site: Left Upper Arm, Position: Sitting, Cuff Size: Large Adult)   Pulse 68   Ht 5' 8.5\" (1.74 m)   Wt 262 lb (118.8 kg)   SpO2 97%   BMI 39.26 kg/m²   Wt Readings from Last 3 Encounters:   02/23/23 262 lb (118.8 kg)   12/23/21 244 lb (110.7 kg)   10/28/21 223 lb (101.2 kg)     Constitutional: He is oriented to person, place, and time. He appears well-developed and well-nourished. In no acute distress. Head: Normocephalic and atraumatic. Pupils equal and round. Neck: Neck supple. No JVP or carotid bruit appreciated. No mass and no thyromegaly present. No lymphadenopathy present. Cardiovascular: Normal rate. Normal heart sounds. Exam reveals no gallop and no friction rub. No murmur heard. Pulmonary/Chest: Effort normal. No respiratory distress. Diminished breath sounds with diffuse bilateral wheezes. Abdominal: Soft, non-tender. Bowel sounds are normal. He exhibits no organomegaly, mass or bruit. Extremities: No edema. No cyanosis or clubbing. Pulses are 2+ radial/carotid bilaterally. Neurological: No gross cranial nerve deficit. Coordination normal.   Skin: Skin is warm and dry. There is no rash or diaphoresis. Psychiatric: He has a normal mood and affect. His speech is normal and behavior is normal.     Lab Review:6/2021 TG 86, HDL 36,   Lab Results   Component Value Date/Time    TRIG 64 11/11/2014 10:14 AM    HDL 42 11/11/2014 10:14 AM    LDLCALC 98 11/11/2014 10:14 AM    LABVLDL 13 11/11/2014 10:14 AM     BUN/Creatinine:    Lab Results   Component Value Date/Time    BUN 26 11/19/2014 05:37 AM    CREATININE 0.9 11/19/2014 05:37 AM   12/2021 BUN 16, Cr 0.77. K 5.2. H/H 13.6, 44.4. . CRP 10.62. EKG Interpretation:   10/28/21 NSR, normal EK.   2/23/23 Sinus rhythm with first degree A-V block. Prominent R(V1); early transition versus porterior infarct. Image Review:   11/2014 Cath findings  1. Severe left main coronary artery disease of 99%.   There is 25% plaque disease otherwise in the left anterior descending coronary artery and circumflex. This is a right dominant coronary arterial system with a 60% proximal right coronary artery lesion. There are collaterals to the left system from the right, which is a large vessel. 2.  Moderately severe left ventricular systolic dysfunction with anterior wall hypokinesis in the mid and distal portions in the apex. Left ventricle ejection fraction 35%. 3.  No gradient across the aortic valve on pullback to assess the aortic stenosis. 4.  Moderately severe left ventricular diastolic dysfunction with an left ventricle end-diastolic pressure of 29 mmHg. s/p CABG x3 LIMA->LAD, SVG->RI->OM. Plain GXT 4/2015   No ischemia. Stress test 10/10/18   Normal stress test with no ischemia     Echo 11/2014   LVEF 40-45%, mild-mod MR. Echo 10/2017   LVEF normal, grade I DD, mildly reduced RVSF. Event recorder 7/2016   no arrhythmias. Echo 12/23/21  Suboptimal image quality. Normal left ventricle size, wall thickness, and systolic function with an  estimated ejection fraction of 55-60%. No regional wall motion abnormalities  are seen. The right ventricle is normal in size and function. Trivial mitral regurgitation. Assessment/Plan:     1. CAD s/p CABG (2014) with chronic angina. Patient describes both chronic exertional chest pains and chest wall pains. Continue with medical management and risk factor modification including ASA and B-blocker. Statin and Nexlizet discontinued with myopathy. NTG SL prescribed and instructed pt in use. Advised to call if having chest pains of increasing frequency, duration, or intensity. Will pursue insurance approval for PCSK9 inhibitors. 2. Ischemic CM with restored LVEF. EF 55-60%. Patient appears compensated. Continue B-Blocker. ACE-I dose previously lowered secondary to hyperkalemia. ACE-I stopped in view of immune mediated necrotizing myopathy. 3. Hyperlipidemia.  (6/2021).  Statin stopped prior to his myopathy hospitalization and  Will pursue insurance approval PCSK9 Inhibitors. Will check updated lab work. 4. COPD/SANTINO. Steroids prescribed daily but taking steroids prn, using BiPAP and nebulizer. Followed by pulmonary. 5. Essential hypertension. Controlled. Goal BP <130/80. Continue medical therapy. 6. Immune mediated necrotizing myopathy/Polymyositis. Management per rheumatology. \"Stains should be completely avoided\" per the rheumatologist.    Follow up in office in 1 year     Thank you very much for allowing me to participate in the care of your patient. Please do not hesitate to contact me if you have any questions. Sincerely,  Paul Shepard. Sadie Phillips, [address] [place] [address]  Ph: [phone]  Fax: [phone]    This note was scribed in the presence of Dr Sadie Phillips MD by Waldemar Crane RN. Physician Attestation: The scribes documentation has been prepared under my direction and personally reviewed by me in its entirety. I confirm that the note above accurately reflects all work, treatment, procedures, and medical decision making performed by me. All portions of the note including but not limited to the chief complaint, history of present illness, physical exam, assessment and plan/medical decision making were personally reviewed, edited, and updated on the day of the visit.

## 2023-02-23 ENCOUNTER — TELEPHONE (OUTPATIENT)
Dept: CARDIOLOGY CLINIC | Age: 63
End: 2023-02-23

## 2023-02-23 ENCOUNTER — OFFICE VISIT (OUTPATIENT)
Dept: CARDIOLOGY CLINIC | Age: 63
End: 2023-02-23
Payer: MEDICARE

## 2023-02-23 VITALS
BODY MASS INDEX: 38.8 KG/M2 | HEIGHT: 69 IN | SYSTOLIC BLOOD PRESSURE: 128 MMHG | HEART RATE: 68 BPM | OXYGEN SATURATION: 97 % | WEIGHT: 262 LBS | DIASTOLIC BLOOD PRESSURE: 68 MMHG

## 2023-02-23 DIAGNOSIS — I10 ESSENTIAL HYPERTENSION: ICD-10-CM

## 2023-02-23 DIAGNOSIS — E78.00 PURE HYPERCHOLESTEROLEMIA: ICD-10-CM

## 2023-02-23 DIAGNOSIS — G47.33 OSA (OBSTRUCTIVE SLEEP APNEA): ICD-10-CM

## 2023-02-23 DIAGNOSIS — I25.5 ISCHEMIC CARDIOMYOPATHY: ICD-10-CM

## 2023-02-23 DIAGNOSIS — I25.10 CORONARY ARTERY DISEASE INVOLVING NATIVE CORONARY ARTERY OF NATIVE HEART WITHOUT ANGINA PECTORIS: Primary | ICD-10-CM

## 2023-02-23 PROCEDURE — G8484 FLU IMMUNIZE NO ADMIN: HCPCS | Performed by: INTERNAL MEDICINE

## 2023-02-23 PROCEDURE — 99214 OFFICE O/P EST MOD 30 MIN: CPT | Performed by: INTERNAL MEDICINE

## 2023-02-23 PROCEDURE — 1036F TOBACCO NON-USER: CPT | Performed by: INTERNAL MEDICINE

## 2023-02-23 PROCEDURE — 3078F DIAST BP <80 MM HG: CPT | Performed by: INTERNAL MEDICINE

## 2023-02-23 PROCEDURE — 3074F SYST BP LT 130 MM HG: CPT | Performed by: INTERNAL MEDICINE

## 2023-02-23 PROCEDURE — G8427 DOCREV CUR MEDS BY ELIG CLIN: HCPCS | Performed by: INTERNAL MEDICINE

## 2023-02-23 PROCEDURE — G8417 CALC BMI ABV UP PARAM F/U: HCPCS | Performed by: INTERNAL MEDICINE

## 2023-02-23 PROCEDURE — 93000 ELECTROCARDIOGRAM COMPLETE: CPT | Performed by: INTERNAL MEDICINE

## 2023-02-23 PROCEDURE — 3017F COLORECTAL CA SCREEN DOC REV: CPT | Performed by: INTERNAL MEDICINE

## 2023-06-06 RX ORDER — CARVEDILOL 3.12 MG/1
TABLET ORAL
Qty: 180 TABLET | Refills: 3 | Status: SHIPPED | OUTPATIENT
Start: 2023-06-06

## 2023-06-06 NOTE — TELEPHONE ENCOUNTER
Last OV: 2/23/23  Next OV: X due yearly  Last refill: 3/24/22 #180  3 R/F  Most recent Labs: 3/22/23  in Care everywhere  Last EKG (if needed): 2/23/23

## 2023-07-31 ENCOUNTER — TELEPHONE (OUTPATIENT)
Dept: CARDIOLOGY CLINIC | Age: 63
End: 2023-07-31

## 2023-07-31 NOTE — TELEPHONE ENCOUNTER
Submitted PA for [facility]PATHA  [facility] Key: [id number] STATUS: PENDING. Follow up done daily; if no response in three days we will refax for status check. If another three days goes by with no response we will call the insurance for status.

## 2023-09-16 ENCOUNTER — TELEPHONE (OUTPATIENT)
Dept: CARDIOLOGY CLINIC | Age: 63
End: 2023-09-16

## 2023-09-25 RX ORDER — EVOLOCUMAB 140 MG/ML
INJECTION, SOLUTION SUBCUTANEOUS
Qty: 2 ADJUSTABLE DOSE PRE-FILLED PEN SYRINGE | Refills: 6 | Status: SHIPPED | OUTPATIENT
Start: 2023-09-25

## 2023-09-25 NOTE — TELEPHONE ENCOUNTER
Patient's wife called the office regarding the refill of Yoel's Repatha refill. Originally sent to [facility] by Pharmacy. Did inform Yoel's wife, that we will inform Dr. Almaz Vasquez that the refill is needed.     Isreal Shepherd did confirm the current pharmacy:  [facility]/PHARMACY [address] [facility]

## 2023-12-11 ENCOUNTER — TELEPHONE (OUTPATIENT)
Dept: CARDIOLOGY CLINIC | Age: 63
End: 2023-12-11

## 2023-12-11 NOTE — TELEPHONE ENCOUNTER
Submitted PA for REPATHA  Via [facility] (Key: [id number] STATUS: PENDING.    Follow up done daily; if no response in three days we will refax for status check.  If another three days goes by with no response we will call the insurance for status.

## 2023-12-12 NOTE — TELEPHONE ENCOUNTER
Called patient left message RX for Repatha has been approve through his insurance. If any questions please give us a call back.

## 2024-02-09 RX ORDER — EVOLOCUMAB 140 MG/ML
INJECTION, SOLUTION SUBCUTANEOUS
Qty: 2 ADJUSTABLE DOSE PRE-FILLED PEN SYRINGE | Refills: 2 | Status: SHIPPED | OUTPATIENT
Start: 2024-02-09

## 2024-02-09 NOTE — TELEPHONE ENCOUNTER
Last OV: 2/23/23  Next OV: X due yearly  Last refill: 9/25/23 #2  6 R/F  Most recent Labs: 1/10/24 in Care Everywhere  Last EKG (if needed): 2/23/23    Called patient left message to return call and set up his yearly follow up with Dr Pina.

## 2024-02-29 ENCOUNTER — TELEPHONE (OUTPATIENT)
Dept: CARDIOLOGY CLINIC | Age: 64
End: 2024-02-29

## 2024-02-29 ENCOUNTER — OFFICE VISIT (OUTPATIENT)
Dept: CARDIOLOGY CLINIC | Age: 64
End: 2024-02-29
Payer: MEDICARE

## 2024-02-29 VITALS
HEART RATE: 69 BPM | WEIGHT: 271 LBS | OXYGEN SATURATION: 95 % | SYSTOLIC BLOOD PRESSURE: 140 MMHG | BODY MASS INDEX: 41.07 KG/M2 | HEIGHT: 68 IN | DIASTOLIC BLOOD PRESSURE: 90 MMHG

## 2024-02-29 DIAGNOSIS — I25.5 ISCHEMIC CARDIOMYOPATHY: ICD-10-CM

## 2024-02-29 DIAGNOSIS — I25.10 CORONARY ARTERY DISEASE INVOLVING NATIVE CORONARY ARTERY OF NATIVE HEART WITHOUT ANGINA PECTORIS: Primary | ICD-10-CM

## 2024-02-29 DIAGNOSIS — I10 ESSENTIAL HYPERTENSION: ICD-10-CM

## 2024-02-29 PROCEDURE — 93000 ELECTROCARDIOGRAM COMPLETE: CPT | Performed by: INTERNAL MEDICINE

## 2024-02-29 PROCEDURE — 99214 OFFICE O/P EST MOD 30 MIN: CPT | Performed by: INTERNAL MEDICINE

## 2024-02-29 PROCEDURE — G8427 DOCREV CUR MEDS BY ELIG CLIN: HCPCS | Performed by: INTERNAL MEDICINE

## 2024-02-29 PROCEDURE — 3077F SYST BP >= 140 MM HG: CPT | Performed by: INTERNAL MEDICINE

## 2024-02-29 PROCEDURE — G8417 CALC BMI ABV UP PARAM F/U: HCPCS | Performed by: INTERNAL MEDICINE

## 2024-02-29 PROCEDURE — 1036F TOBACCO NON-USER: CPT | Performed by: INTERNAL MEDICINE

## 2024-02-29 PROCEDURE — 3080F DIAST BP >= 90 MM HG: CPT | Performed by: INTERNAL MEDICINE

## 2024-02-29 PROCEDURE — G8484 FLU IMMUNIZE NO ADMIN: HCPCS | Performed by: INTERNAL MEDICINE

## 2024-02-29 PROCEDURE — 3017F COLORECTAL CA SCREEN DOC REV: CPT | Performed by: INTERNAL MEDICINE

## 2024-02-29 NOTE — TELEPHONE ENCOUNTER
Left VM stating it is not necessary to move appointment to 2:30 p.m., but I did move it from 1 p.m. to 1:15 p.m.

## 2024-02-29 NOTE — PROGRESS NOTES
[facility]      Cardiology Consult    Yoel Rogers  1960    February 29, 2024    Primary Physician: Dr. BENNETT Mckinney  Reason for visit: CAD s/p CABG and CHF    CC: \"Nothing new.\"     HPI:  The patient is 63 y.o. male with a past medical history significant for CAD s/p NSTEMI 11/2014 and CABG x3 with LIMA->LAD, SVG->RI->OM, ischemic CM with restored LVEF, chronic chest pains, SANTINO, COPD, and HLD who presents for chronic CAD management. He initially presented with NSTEMI 11/2014 with diffuse severe chest pain and associated left arm pain. He denies any interventions since CABG but last had a stress test in 2018 secondary to recurrent chest pains. He endorses left sided chest pain when shoveling snow last week that lasted 10 minutes without radiation. With normal activity, he denies exertional chest pain. He also has intermittent but chronic left sternal discomfort. Both the left sided and sternal pains have occurred intermittently since surgery. Pt was admitted to [facility] 9/2021 for \"immune mediated necrotizing myopathy\" and seen by rheumatology. His methotrexate dose was increased and he was also treated with steroids and antibiotics. He was advised to stop taking ASA, statin and ACE-I. He states that he was told his myopathy was related to atorvastatin but he was not actually taking the medication when severe symptoms started. He has since resumed his ASA. He states that he continues to have muscle weakness and is unable to walk longer distances. He participated in physical therapy and states that he had to learn to walk again.     Today, he states overall he is doing well. He denies any new cardiac complaints and states he continues to remain active without any exertional symptoms. He denies any chest pains or worsening shortness of breath.  His blood pressure is slightly elevated today but he does not routinely check at home.  He reports compliance with his medications and tolerating. He

## 2024-05-03 ENCOUNTER — TELEPHONE (OUTPATIENT)
Dept: CARDIOLOGY CLINIC | Age: 64
End: 2024-05-03

## 2024-05-03 RX ORDER — EVOLOCUMAB 140 MG/ML
INJECTION, SOLUTION SUBCUTANEOUS
OUTPATIENT
Start: 2024-05-03

## 2024-05-03 NOTE — TELEPHONE ENCOUNTER
CARDIAC CLEARANCE     Procedure: Prostate ultrasound/biopsy Cystoscopy possible bladder biopsy  : [facility]   Date: 05/28/2024    Medications needing held: Aspirin     How long?: 7 days     Phone Number and Contact Name for Physicians office:  [phone]    Fax number to send information:  [phone]        Cardiologist: Dr Pina  Last Appointment: 02/29/224  Next Appointment: None scheduled  Cardiac History: Past medical history significant for CAD s/p NSTEMI 11/2014 and CABG x3 with LIMA->LAD, SVG->RI->OM, ischemic CM with restored LVEF, chronic chest pains, SANTINO, COPD, and HLD who presents for chronic CAD management.     CC REQUEST SCANNED INTO CHART

## 2024-05-03 NOTE — TELEPHONE ENCOUNTER
There is no cardiac contraindication to cystoscopy/biopsy.  No additional cardiac testing is required prior to procedure.  Aspirin may be held for 7 days prior to procedure and resume when possible.

## 2024-05-03 NOTE — TELEPHONE ENCOUNTER
LVM fpr pt regarding clearance from a cardiac standpoint for upcoming prostate ultrasound/biopsy Cystoscopy possible bladder biopsy procedure. Letter created and sent over to [facility]. Scanned into media for review.

## 2024-05-03 NOTE — TELEPHONE ENCOUNTER
Dr. Pina, please review and advise for a cardiac clearance. Thank you.    Patient will be undergoing a prostate/bladder biopsy on 5/28/24. Requesting to hold ASA for 7 days prior. History of CAD s/p CABG (2014), ICM (55-60%). Last seen in office 2/29/24.

## 2024-07-05 ENCOUNTER — TELEPHONE (OUTPATIENT)
Dept: CARDIOLOGY CLINIC | Age: 64
End: 2024-07-05

## 2024-07-05 RX ORDER — EVOLOCUMAB 140 MG/ML
INJECTION, SOLUTION SUBCUTANEOUS
Qty: 2 ADJUSTABLE DOSE PRE-FILLED PEN SYRINGE | Refills: 3 | Status: SHIPPED | OUTPATIENT
Start: 2024-07-05

## 2024-07-05 NOTE — TELEPHONE ENCOUNTER
Last OV: 2/29/24  Next OV: X due yearly  Last refill: 2/29/24 #2 2 R/F  Most recent Labs: 1/10/24  Last EKG (if needed): 2/29/24

## 2024-07-05 NOTE — TELEPHONE ENCOUNTER
CARDIAC CLEARANCE     What type of procedure are you having?  NERVE BLOCK SHOT    Which physician is performing your procedure?  DR. LUPIS BROWN    When is your procedure scheduled?  TBD    Where are you having this procedure?  [facility]    Are you taking Blood Thinners?   If so what? (Name/dose/frequesncy)  aspirin 81 MG tablet     Does the surgeon want you to stop your blood thinner?  If so for how long?    Are you having any new or worsening cardiac symptoms?   N/A    Phone Number and Contact Name for Physicians office:  [phone]    Fax number to send information:    Cardiac History:  Last office visit with Cardiologist:  02/2024    Cardiac Procedures:    Cardiac Testing:

## 2024-07-08 NOTE — TELEPHONE ENCOUNTER
Last office visit 02/29/24  Having a nerve block   Any recommendations   Does take ASA 81 mg  he is unsure how long they want him off it .    He also states has prostate CA and will be having a prostatectomy in the future will call back for clearance when scheduled

## 2024-07-08 NOTE — TELEPHONE ENCOUNTER
There is no cardiac contraindication to the procedure.  Aspirin may be held for 7 days prior to injection if necessary.  Resume when possible.

## 2024-07-15 RX ORDER — CARVEDILOL 3.12 MG/1
TABLET ORAL
Qty: 180 TABLET | Refills: 3 | Status: SHIPPED | OUTPATIENT
Start: 2024-07-15

## 2024-07-15 NOTE — TELEPHONE ENCOUNTER
Last OV: 2/29/24  Next OV: X due yearly  Last refill: 6/6/23 #180 3 R/F  Most recent Labs: 1/10/24  Last EKG (if needed): 2/29/24

## 2024-08-12 ENCOUNTER — TELEPHONE (OUTPATIENT)
Dept: CARDIOLOGY CLINIC | Age: 64
End: 2024-08-12

## 2024-08-12 NOTE — TELEPHONE ENCOUNTER
Medication Question/Concern    What is the name of the medication you need to speak with someone about?  Ozempic     Was this prescribed by your cardiologist?   No    Dosage of the medication:    How are you taking this medication (QD, BID, TID, QID, PRN):    What issues/concerns are you having with this medication?  Patient's wife called in to make sure it's okay for him to take this medication.     Please advise.     Callback: [phone]

## 2024-08-12 NOTE — TELEPHONE ENCOUNTER
Patient last seen 2/29/24 for management of CAD s/p NSTEMI 11/2014 and CABG x3 with LIMA->LAD, SVG->RI->OM, ischemic CM with restored LVEF, chronic chest pains, SANTINO, COPD, and HLD who presents for chronic CAD management. He initially presented with NSTEMI 11/2014 with diffuse severe chest pain and associated left arm pain.    Called spoke with patient's wife Matthew,  patient has prostate cancer and needs to loose weight. States has not started taking yet, he wants to check with Dr Pina before starting.     Cardiac medication patient Is taking   Carvedilol 3.125 mg BID  Repatha 140 mg/ml  Nittro on hand.     Please advise if okay to take Ozempic.

## 2024-08-13 NOTE — TELEPHONE ENCOUNTER
Please advise in Dr nielson absence   With hx of necrotizig myopathy and managed by rheumatology statins should be completely avoided   Pt asking if Ozempic is ok to take   Or do we need to ask Rheumatology    Thank you     Tried to call pt left a message that looking into the question

## 2024-08-14 NOTE — TELEPHONE ENCOUNTER
Dr. Chance, please advise in Dr. Pina's absence. Thank you.     Is there any cardiac contraindication to patient taking Ozempic? He has history of CAD s/p CABG, ICM with EF of 55-60%, HLD, SANTINO, COPD, HTN. Will advise patient to discuss with pharmacist about any medication interactions. He is taking ASA, Coreg, SL nitro as needed, and Repatha from a cardiac standpoint. Last OV with Dr. Pina on 2/29/24.

## 2024-08-14 NOTE — TELEPHONE ENCOUNTER
Please notify patient that he is okay to take Ozempic from a cardiac standpoint. Please advise he should always call his local pharmacist to discuss any possible drug interactions when starting new medications, but the prescribing physician should also review this as well. Thanks.

## 2024-08-14 NOTE — TELEPHONE ENCOUNTER
I am not sure Ozempic is covered by insurance company if patient does not have diabetes.  I will defer this decision to patient's primary care physician.  Patient is certainly okay to take Ozempic from cardiac standpoint

## 2024-09-09 RX ORDER — EVOLOCUMAB 140 MG/ML
INJECTION, SOLUTION SUBCUTANEOUS
Qty: 2 ADJUSTABLE DOSE PRE-FILLED PEN SYRINGE | Refills: 3 | Status: SHIPPED | OUTPATIENT
Start: 2024-09-09

## 2024-11-18 RX ORDER — EVOLOCUMAB 140 MG/ML
INJECTION, SOLUTION SUBCUTANEOUS
Qty: 2 ADJUSTABLE DOSE PRE-FILLED PEN SYRINGE | Refills: 3 | Status: SHIPPED | OUTPATIENT
Start: 2024-11-18

## 2024-11-18 NOTE — TELEPHONE ENCOUNTER
Last OV: 2/29/24  Next OV: X due yearly  Last refill: 9/9/24 # 2 3 R/F  Most recent Labs: 1/10/24  Last EKG (if needed): 2/29/24

## 2025-01-13 NOTE — PROGRESS NOTES
[facility]      Cardiology Consult    Yoel Rogers  1960    January 16, 2025    Primary Physician: Dr. BENNETT Mckinney  Reason for visit: CAD s/p CABG and CHF    CC: \"I need hernia surgery\"     HPI:  The patient is 64 y.o. male with a past medical history significant for CAD s/p NSTEMI 11/2014 and CABG x3 with LIMA->LAD, SVG->RI->OM, ischemic CM with restored LVEF, chronic chest pains, SANTINO, COPD, and HLD who presents for chronic CAD management. He initially presented with NSTEMI 11/2014 with diffuse severe chest pain and associated left arm pain. He denies any interventions since CABG but last had a stress test in 2018 secondary to recurrent chest pains. He endorses left sided chest pain when shoveling snow last week that lasted 10 minutes without radiation. With normal activity, he denies exertional chest pain. He also has intermittent but chronic left sternal discomfort. Both the left sided and sternal pains have occurred intermittently since surgery. Pt was admitted to [facility] 9/2021 for \"immune mediated necrotizing myopathy\" and seen by rheumatology. His methotrexate dose was increased and he was also treated with steroids and antibiotics. He was advised to stop taking ASA, statin and ACE-I. He states that he was told his myopathy was related to atorvastatin but he was not actually taking the medication when severe symptoms started. He has since resumed his ASA. Today he presents for follow up, accompanied by his wife. He states that overall he is feeling well. He denies any new sounding cardiac complaints. He continues with chronic chest pains, but denies any acute changes. He denies any worsening shortness of breath. He reports medication compliance and is tolerating. He states he will be undergoing hernia repair surgery. He denies any abnormal bleeding or bruising. He denies exertional chest pain/pressure, dyspnea at rest, worsening KRISHNAMURTHY, PND, orthopnea, palpitations, lightheadedness, weight

## 2025-01-16 ENCOUNTER — OFFICE VISIT (OUTPATIENT)
Dept: CARDIOLOGY CLINIC | Age: 65
End: 2025-01-16
Payer: MEDICARE

## 2025-01-16 VITALS
BODY MASS INDEX: 36.98 KG/M2 | OXYGEN SATURATION: 95 % | DIASTOLIC BLOOD PRESSURE: 84 MMHG | SYSTOLIC BLOOD PRESSURE: 134 MMHG | WEIGHT: 244 LBS | HEART RATE: 77 BPM | HEIGHT: 68 IN

## 2025-01-16 DIAGNOSIS — Z01.818 PRE-OPERATIVE EXAM: ICD-10-CM

## 2025-01-16 DIAGNOSIS — I25.10 CORONARY ARTERY DISEASE INVOLVING NATIVE CORONARY ARTERY OF NATIVE HEART WITHOUT ANGINA PECTORIS: Primary | ICD-10-CM

## 2025-01-16 DIAGNOSIS — I25.5 ISCHEMIC CARDIOMYOPATHY: ICD-10-CM

## 2025-01-16 DIAGNOSIS — I10 ESSENTIAL HYPERTENSION: ICD-10-CM

## 2025-01-16 DIAGNOSIS — E78.5 HYPERLIPIDEMIA LDL GOAL <70: ICD-10-CM

## 2025-01-16 PROCEDURE — 3017F COLORECTAL CA SCREEN DOC REV: CPT | Performed by: INTERNAL MEDICINE

## 2025-01-16 PROCEDURE — 93000 ELECTROCARDIOGRAM COMPLETE: CPT | Performed by: INTERNAL MEDICINE

## 2025-01-16 PROCEDURE — G8417 CALC BMI ABV UP PARAM F/U: HCPCS | Performed by: INTERNAL MEDICINE

## 2025-01-16 PROCEDURE — 3079F DIAST BP 80-89 MM HG: CPT | Performed by: INTERNAL MEDICINE

## 2025-01-16 PROCEDURE — 1036F TOBACCO NON-USER: CPT | Performed by: INTERNAL MEDICINE

## 2025-01-16 PROCEDURE — 3075F SYST BP GE 130 - 139MM HG: CPT | Performed by: INTERNAL MEDICINE

## 2025-01-16 PROCEDURE — 99214 OFFICE O/P EST MOD 30 MIN: CPT | Performed by: INTERNAL MEDICINE

## 2025-01-16 PROCEDURE — G8427 DOCREV CUR MEDS BY ELIG CLIN: HCPCS | Performed by: INTERNAL MEDICINE

## 2025-01-16 RX ORDER — BETAMETHASONE VALERATE 1.2 MG/G
CREAM TOPICAL
COMMUNITY
Start: 2024-06-03

## 2025-01-16 RX ORDER — DULOXETIN HYDROCHLORIDE 30 MG/1
30 CAPSULE, DELAYED RELEASE ORAL DAILY
COMMUNITY

## 2025-01-16 RX ORDER — SEMAGLUTIDE 1 MG/.5ML
1 INJECTION, SOLUTION SUBCUTANEOUS
COMMUNITY
Start: 2024-12-16

## 2025-01-16 RX ORDER — DIPHENOXYLATE HYDROCHLORIDE AND ATROPINE SULFATE 2.5; .025 MG/1; MG/1
1 TABLET ORAL 4 TIMES DAILY PRN
COMMUNITY
Start: 2024-12-20 | End: 2025-01-19

## 2025-01-16 RX ORDER — PREDNISOLONE ACETATE 10 MG/ML
1 SUSPENSION/ DROPS OPHTHALMIC 3 TIMES DAILY
COMMUNITY

## 2025-01-16 RX ORDER — PREDNISONE 10 MG/1
10 TABLET ORAL DAILY
COMMUNITY
Start: 2025-01-10

## 2025-01-16 RX ORDER — BUDESONIDE, GLYCOPYRROLATE, AND FORMOTEROL FUMARATE 160; 9; 4.8 UG/1; UG/1; UG/1
2 AEROSOL, METERED RESPIRATORY (INHALATION) 2 TIMES DAILY
COMMUNITY
Start: 2024-05-03

## 2025-01-16 RX ORDER — MECLIZINE HYDROCHLORIDE 25 MG/1
25 TABLET ORAL 3 TIMES DAILY
COMMUNITY
Start: 2024-12-01

## 2025-02-07 RX ORDER — EVOLOCUMAB 140 MG/ML
INJECTION, SOLUTION SUBCUTANEOUS
Qty: 6 ADJUSTABLE DOSE PRE-FILLED PEN SYRINGE | Refills: 3 | Status: SHIPPED | OUTPATIENT
Start: 2025-02-07

## 2025-02-07 NOTE — TELEPHONE ENCOUNTER
Last OV: 25 - Striet  Next OV: 24 - Striet  Last Labs: 24  Last EK25   Last Filled: 24  #1  1 Injection Monthly  3RF

## 2025-07-03 RX ORDER — CARVEDILOL 3.12 MG/1
3.12 TABLET ORAL 2 TIMES DAILY
Qty: 180 TABLET | Refills: 3 | Status: SHIPPED | OUTPATIENT
Start: 2025-07-03

## 2025-07-03 NOTE — TELEPHONE ENCOUNTER
Last OV: 1/16/25  Next OV: due yearly  Last refill: 7/15/24 #180 3 R/F  Most recent Labs: 1/10/25  Last EKG (if needed): 1/16/25